# Patient Record
Sex: FEMALE | Race: BLACK OR AFRICAN AMERICAN | Employment: OTHER | URBAN - METROPOLITAN AREA
[De-identification: names, ages, dates, MRNs, and addresses within clinical notes are randomized per-mention and may not be internally consistent; named-entity substitution may affect disease eponyms.]

---

## 2018-06-13 ENCOUNTER — OFFICE VISIT (OUTPATIENT)
Dept: HEMATOLOGY | Age: 68
End: 2018-06-13

## 2018-06-13 VITALS
BODY MASS INDEX: 29.07 KG/M2 | RESPIRATION RATE: 12 BRPM | HEART RATE: 63 BPM | TEMPERATURE: 98.8 F | DIASTOLIC BLOOD PRESSURE: 76 MMHG | SYSTOLIC BLOOD PRESSURE: 144 MMHG | WEIGHT: 154 LBS | OXYGEN SATURATION: 98 % | HEIGHT: 61 IN

## 2018-06-13 DIAGNOSIS — K75.81 NASH (NONALCOHOLIC STEATOHEPATITIS): Primary | ICD-10-CM

## 2018-06-13 PROBLEM — E78.00 HYPERCHOLESTEREMIA: Status: ACTIVE | Noted: 2018-06-13

## 2018-06-13 PROBLEM — I10 HYPERTENSION: Status: ACTIVE | Noted: 2018-06-13

## 2018-06-13 PROBLEM — E11.9 TYPE II DIABETES MELLITUS (HCC): Status: ACTIVE | Noted: 2018-06-13

## 2018-06-13 RX ORDER — SIMVASTATIN 20 MG/1
1 TABLET, FILM COATED ORAL
COMMUNITY
Start: 2017-07-10

## 2018-06-13 RX ORDER — METFORMIN HYDROCHLORIDE 500 MG/1
500 TABLET, EXTENDED RELEASE ORAL
COMMUNITY
Start: 2017-07-13 | End: 2018-07-13

## 2018-06-13 RX ORDER — GLUCOSAMINE SULFATE 1500 MG
2000 POWDER IN PACKET (EA) ORAL
COMMUNITY
Start: 2018-06-04

## 2018-06-13 RX ORDER — MINERAL OIL
1 ENEMA (ML) RECTAL
COMMUNITY
Start: 2017-12-04

## 2018-06-13 RX ORDER — GLIPIZIDE 5 MG/1
5 TABLET, FILM COATED, EXTENDED RELEASE ORAL 3 TIMES DAILY
COMMUNITY
Start: 2017-07-10

## 2018-06-13 RX ORDER — TRIAMTERENE AND HYDROCHLOROTHIAZIDE 37.5; 25 MG/1; MG/1
CAPSULE ORAL
Refills: 0 | COMMUNITY
Start: 2018-06-09

## 2018-06-13 RX ORDER — AMLODIPINE BESYLATE 10 MG/1
10 TABLET ORAL
COMMUNITY
Start: 2017-07-10

## 2018-06-13 RX ORDER — POTASSIUM CHLORIDE 20 MEQ/1
20 TABLET, EXTENDED RELEASE ORAL
COMMUNITY
Start: 2017-12-04 | End: 2018-12-04

## 2018-06-13 NOTE — MR AVS SNAPSHOT
[address] [address] 
[phone] Patient: Rukhsana Medrano MRN: [id number] IPC:[id number] Visit Information Date & Time Provider Department Dept. Phone Encounter #  
 6/13/2018  3:00 PM MD Rufina Newman 13 of  [address] [id number] Upcoming Health Maintenance Date Due Hepatitis C Screening 1950 DTaP/Tdap/Td series (1 - Tdap) 3/22/1972 BREAST CANCER SCRN MAMMOGRAM 3/22/2001 FOBT Q 1 YEAR AGE 50-75 3/22/2001 ZOSTER VACCINE AGE 60> 1/22/2011 GLAUCOMA SCREENING Q2Y 3/22/2016 Bone Densitometry (Dexa) Screening 3/22/2016 Pneumococcal 65+ Low/Medium Risk (1 of 2 - PCV13) 3/22/2016 Influenza Age 5 to Adult 8/1/2018 Allergies as of 6/13/2018  Review Complete On: 6/13/2018 By: Deonte Nunn Severity Noted Reaction Type Reactions Sulfasalazine High 11/23/2014    Rash Lotrel [Amlodipine-benazepril]  06/13/2018    Cough Current Immunizations  Never Reviewed No immunizations on file. Not reviewed this visit Vitals BP Pulse Temp Resp Height(growth percentile) 144/76 (BP 1 Location: Right arm, BP Patient Position: Sitting) 63 98.8 °F (37.1 °C) (Tympanic) 12 5' 1\" (1.549 m) Weight(growth percentile) SpO2 BMI OB Status Smoking Status 154 lb (69.9 kg) 98% 29.1 kg/m2 Hysterectomy Never Smoker Vitals History BMI and BSA Data Body Mass Index Body Surface Area  
 29.1 kg/m 2 1.73 m 2 Preferred Pharmacy Pharmacy Name Phone [facility] [phone] Your Updated Medication List  
  
   
This list is accurate as of 6/13/18  3:26 PM.  Always use your most recent med list. amLODIPine 10 mg tablet Commonly known as:  Sander Cazares Take 10 mg by mouth. aspirin-calcium carbonate 81 mg-300 mg calcium(777 mg) Tab Take 1 Tab by mouth. blood glucose test strip Generic drug:  glucose blood VI test strips Use to check blood sugar tid  
  
 cholecalciferol 1,000 unit Cap Commonly known as:  VITAMIN D3 Take 2,000 Units by mouth. fexofenadine 180 mg tablet Commonly known as:  Analilia Luis Felipe Take 1 Tab by mouth. glipiZIDE SR 5 mg CR tablet Commonly known as:  GLUCOTROL XL Take 1 Tab by mouth. JANUVIA 100 mg tablet Generic drug:  SITagliptin Take 1 Tab by mouth.  
  
 metFORMIN  mg tablet Commonly known as:  GLUCOPHAGE XR Take 500 mg by mouth.  
  
 potassium chloride 20 mEq tablet Commonly known as:  K-DUR, KLOR-CON Take 20 mEq by mouth.  
  
 triamterene-hydroCHLOROthiazide 37.5-25 mg per capsule Commonly known as:  DYAZIDE  
  
 VITAMIN E PO Take  by mouth. ZOCOR 20 mg tablet Generic drug:  simvastatin Take 1 Tab by mouth. Introducing [facility]! [employer] introduces [facility] patient portal. Now you can access parts of your medical record, email your doctor's office, and request medication refills online. 1. In your internet browser, go to [email or web address] 2. Click on the First Time User? Click Here link in the Sign In box. You will see the New Member Sign Up page. 3. Enter your [facility] Access Code exactly as it appears below. You will not need to use this code after youve completed the sign-up process. If you do not sign up before the expiration date, you must request a new code. · [facility] Access Code: [email or web address] Expires: 9/11/2018  3:26 PM 
 
4. Enter the last four digits of your Social Security Number (xxxx) and Date of Birth (mm/dd/yyyy) as indicated and click Submit. You will be taken to the next sign-up page. 5. Create a [facility] ID. This will be your [facility] login ID and cannot be changed, so think of one that is secure and easy to remember. 6. Create a [employer] password. You can change your password at any time. 7. Enter your Password Reset Question and Answer. This can be used at a later time if you forget your password. 8. Enter your e-mail address. You will receive e-mail notification when new information is available in [address] 9. Click Sign Up. You can now view and download portions of your medical record. 10. Click the Download Summary menu link to download a portable copy of your medical information. If you have questions, please visit the Frequently Asked Questions section of the [employer] website. Remember, [employer] is NOT to be used for urgent needs. For medical emergencies, dial 911. Now available from your iPhone and Android! Please provide this summary of care documentation to your next provider. If you have any questions after today's visit, please call [phone].

## 2018-06-13 NOTE — PROGRESS NOTES
1. Have you been to the ER, urgent care clinic since your last visit? Hospitalized since your last visit? No    2. Have you seen or consulted any other health care providers outside of the [address] [place] [address] since your last visit? Include any pap smears or colon screening.  No   Chief Complaint   Patient presents with    New Patient     fatty liver

## 2018-06-14 NOTE — PROGRESS NOTES
70 Caro Gonzalez MD, [address], [place], [place]       LISA Cheek PA-C Marylin Arm, [facility]   LISA Cabrera NP [facility] 136    at [place] [id number], [address] [address].    [phone]    FAX: [address] [place] [address], [address]    [phone]    FAX: [phone]         Patient Care Team:  Kesha Porras DO as PCP - General (Internal Medicine)   GI. Román OconnellHeron, [place]      Problem List  Date Reviewed: 6/13/2018          Codes Class Noted    KEARNEY (nonalcoholic steatohepatitis) ICD-10-CM: K75.81  ICD-9-CM: 571.8  6/13/2018        Type II diabetes mellitus ([facility] ICD-10-CM: E11.9  ICD-9-CM: 250.00  6/13/2018        Hypertension ICD-10-CM: I10  ICD-9-CM: 401.9  6/13/2018        Hypercholesteremia ICD-10-CM: E78.00  ICD-9-CM: 272.0  6/13/2018                The clinicians listed above have asked me to see Niranjan Saez in consultation regarding KEARNEY. All medical records sent by the referring physicians were reviewed including imaging studies and pathology. The patient is a 80 y.o.  female who was found to have fatty liver disease on liver biopsy     Serologic evaluation for markers of chronic liver disease were positive for  DERIC,    Ultrasound of the liver was performed in 9/2017. The results of the imaging suggested fatty liver disease. A liver biopsy was performed in 1/2018. 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 The patient has no symptoms which can be attributed to the liver disorder.     The patient has not experienced fatigue, pain in the right side over the liver,     The patient completes all daily activities without any functional limitations. All of the issues listed in the Assessment and Plan were discussed with the patient. All questions were answered. The patient expressed a clear understanding of the above. [facility] 87 in 4 months to assessd the impact of diet and weight loss      ASSESSMENT AND PLAN:  KEARNEY   This is based upon lvier biopsy showing mild KEARNEY with portal and pericellular fibrosis. Liver transaminases are elevated. Alkaline phosphate is normal.  Liver function is normal.  The platelet count is normal.    The patient had a liver biopsy performed in [place]. Will request that the liver biopsy slides be sent to me for my personal review. The patient was counseled regarding diet and exercise to achieve weight loss. The best diet for patients with fatty liver is one very low in carbohydrates and enriched with protein such as an Tyler's program.    The patient was told to consume any food products and drinks containing fructose as this enhances hepatic fat synthesis. There is no medication of vitamin supplements that we advocate for KEARNEY. Using glitazones in patients without diabetes mellitus has been shown to reduce fat content in the liver but has no effect on fibrosis and is associated with weight gain. Vitamin E has also been used but the data is not very good and most experts no longer advocate this. The only medical treatments for KEARNEY are though clinical trials. The patient was offered participation in a clinical trial for treatment of KEARNEY. The patient would like to participate in a clinical trial.  We will get the biopsy slides and I will review these to see if she has severe enough KEARNEY to enter a clinical trial.  If not she will be followed in the practice every 4 months.     Treatment of other medical problems in patients with chronic liver disease  The patient was directed to continue all current medications at the current dosages. There are no contraindications for the patient to take any medications that are necessary for treatment of other medical issues. The patient can take oral diabetic agents for treatment of diabetes and statins for treatment of hypercholesterolemia. This will not impact the patient's current liver disease. The patient does not consume alcohol daily. Normal doses of acetaminophen can be used for pain as needed. Normal doses of acetaminophen as recommended on the label are not hepatotoxic, even in patients with cirrhosis. The patient does not have cirrhosis. Normal doses of NSAIDs can be used for pain as needed. Counseling for alcohol in patients with chronic liver disease  The patient was counseled regarding alcohol consumption and the effect of alcohol on chronic liver disease. The patient was reminded that alcohol can cause fatty liver. The patient does not consume any significant amount of alcohol. Vaccinations   Vaccination for viral hepatitis B is recommended since the patient has no serologic evidence of previous exposure or vaccination with immunity. The need for vaccination against viral hepatitis A will be assessed with serologic and instituted as appropriate. Routine vaccinations against other bacterial and viral agents can be performed as indicated. Annual flu vaccination should be administered if indicated. Screening for Hepatocellular Carcinoma  HCC screening is not necessary since the patient has no evidence of cirrhosis. ALLERGIES  Allergies   Allergen Reactions    Sulfasalazine Rash    Lotrel [Amlodipine-Benazepril] Cough       MEDICATIONS  Current Outpatient Prescriptions   Medication Sig    amLODIPine (NORVASC) 10 mg tablet Take 10 mg by mouth.  aspirin-calcium carbonate 81 mg-300 mg calcium(777 mg) tab Take 1 Tab by mouth.  fexofenadine (ALLEGRA) 180 mg tablet Take 1 Tab by mouth.     glipiZIDE SR (GLUCOTROL XL) 5 mg CR tablet Take 1 Tab by mouth.  glucose blood VI test strips (BLOOD GLUCOSE TEST) strip Use to check blood sugar tid    metFORMIN ER (GLUCOPHAGE XR) 500 mg tablet Take 500 mg by mouth.  potassium chloride (K-DUR, KLOR-CON) 20 mEq tablet Take 20 mEq by mouth.  simvastatin (ZOCOR) 20 mg tablet Take 1 Tab by mouth.  SITagliptin (JANUVIA) 100 mg tablet Take 1 Tab by mouth.  triamterene-hydroCHLOROthiazide (DYAZIDE) 37.5-25 mg per capsule     cholecalciferol (VITAMIN D3) 1,000 unit cap Take 2,000 Units by mouth.  vitamin E acetate (VITAMIN E PO) Take  by mouth. No current facility-administered medications for this visit. SYSTEM REVIEW NOT RELATED TO LIVER DISEASE OR REVIEWED ABOVE:  Constitution systems: Negative for fever, chills, weight gain, weight loss. Eyes: Negative for visual changes. ENT: Negative for sore throat, painful swallowing. Respiratory: Negative for cough, hemoptysis, SOB. Cardiology: Negative for chest pain, palpitations. GI:  Negative for constipation or diarrhea. : Negative for urinary frequency, dysuria, hematuria, nocturia. Skin: Negative for rash. Hematology: Negative for easy bruising, blood clots. Musculo-skelatal: Negative for back pain, muscle pain, weakness. Neurologic: Negative for headaches, dizziness, vertigo, memory problems not related to HE. Psychology: Negative for anxiety, depression. FAMILY HISTORY:  The father  of CVA. The mother  of pancreas cancer. There is no family history of liver disease. SOCIAL HISTORY:  The patient is . The patient has 2 children, and 2 grandchildren. The patient has never used tobacco products. The patient has never consumed significant amounts of alcohol.     The patient currently works full time as a .        PHYSICAL EXAMINATION:  Visit Vitals    /76 (BP 1 Location: Right arm, BP Patient Position: Sitting)    Pulse 63    Temp 98.8 °F (37.1 °C) (Tympanic)    Resp 12    Ht 5' 1\" (1.549 m)    Wt 154 lb (69.9 kg)    SpO2 98%    BMI 29.1 kg/m2     General: No acute distress. Eyes: Sclera anicteric. ENT: No oral lesions. Thyroid normal.  Nodes: No adenopathy. Skin: No spider angiomata. No jaundice. No palmar erythema. Respiratory: Lungs clear to auscultation. Cardiovascular: Regular heart rate. No murmurs. No JVD. Abdomen: Soft non-tender. Liver size normal to percussion/palpation. Spleen not palpable. No obvious ascites. Extremities: No edema. No muscle wasting. No gross arthritic changes. Neurologic: Alert and oriented. Cranial nerves grossly intact. No asterixis. LABORATORY STUDIES:  From 5/2018  AST/ALT/ALP/T Bili/ALB:  29/50/65/0.5/4.0  WBC/HB/PLT/INR:  5.1/13.7/197  BUN/CREAT:  12/0.84    SEROLOGIES:  11/2017. HBsAntigen negative, anti-HBcore negative, anti-HBsurface negative, anti-HCV negative, iron saturation 20%, DERIC positive, ASMA negative, AMA negative, alpha-1-antitrypsin 140. LIVER HISTOLOGY:  1/2018.  30% steatosis, mild inflammation. Portal and pericellular fibrosis, stage 2. Biopsy slides not reviewed by MLS. ENDOSCOPIC PROCEDURES:  Not available or performed    RADIOLOGY:  5/2018. Ultrasound of liver. Echogenic consistent with fatty liver. No liver mass lesions. No dilated bile ducts. No ascites.     OTHER TESTING:  Not available or performed      Annia Diaz MD  [facility] 13  [address], [address]  [phone]

## 2018-10-11 ENCOUNTER — HOSPITAL ENCOUNTER (OUTPATIENT)
Dept: LAB | Age: 68
Discharge: HOME | End: 2018-10-11
Payer: MEDICARE

## 2018-10-11 ENCOUNTER — OFFICE VISIT (OUTPATIENT)
Dept: HEMATOLOGY | Age: 68
End: 2018-10-11

## 2018-10-11 VITALS
BODY MASS INDEX: 27.9 KG/M2 | TEMPERATURE: 97.4 F | RESPIRATION RATE: 18 BRPM | HEART RATE: 72 BPM | SYSTOLIC BLOOD PRESSURE: 144 MMHG | WEIGHT: 147.8 LBS | HEIGHT: 61 IN | OXYGEN SATURATION: 98 % | DIASTOLIC BLOOD PRESSURE: 77 MMHG

## 2018-10-11 DIAGNOSIS — K75.81 NASH (NONALCOHOLIC STEATOHEPATITIS): ICD-10-CM

## 2018-10-11 DIAGNOSIS — K75.81 NASH (NONALCOHOLIC STEATOHEPATITIS): Primary | ICD-10-CM

## 2018-10-11 LAB
ALBUMIN SERPL-MCNC: 4.1 G/DL (ref 3.4–5)
ALBUMIN/GLOB SERPL: 1.1 {RATIO} (ref 0.8–1.7)
ALP SERPL-CCNC: 70 U/L (ref 45–117)
ALT SERPL-CCNC: 47 U/L (ref 13–56)
ANION GAP SERPL CALC-SCNC: 8 MMOL/L (ref 3–18)
AST SERPL-CCNC: 19 U/L (ref 15–37)
BASOPHILS # BLD: 0 K/UL (ref 0–0.1)
BASOPHILS NFR BLD: 0 % (ref 0–2)
BILIRUB DIRECT SERPL-MCNC: 0.1 MG/DL (ref 0–0.2)
BILIRUB SERPL-MCNC: 0.2 MG/DL (ref 0.2–1)
BUN SERPL-MCNC: 11 MG/DL (ref 7–18)
BUN/CREAT SERPL: 16 (ref 12–20)
CALCIUM SERPL-MCNC: 9.7 MG/DL (ref 8.5–10.1)
CHLORIDE SERPL-SCNC: 104 MMOL/L (ref 100–108)
CO2 SERPL-SCNC: 31 MMOL/L (ref 21–32)
CREAT SERPL-MCNC: 0.68 MG/DL (ref 0.6–1.3)
DIFFERENTIAL METHOD BLD: ABNORMAL
EOSINOPHIL # BLD: 0.1 K/UL (ref 0–0.4)
EOSINOPHIL NFR BLD: 1 % (ref 0–5)
ERYTHROCYTE [DISTWIDTH] IN BLOOD BY AUTOMATED COUNT: 16.6 % (ref 11.6–14.5)
GLOBULIN SER CALC-MCNC: 3.7 G/DL (ref 2–4)
GLUCOSE SERPL-MCNC: 83 MG/DL (ref 74–99)
HCT VFR BLD AUTO: 41.3 % (ref 35–45)
HGB BLD-MCNC: 13.1 G/DL (ref 12–16)
LYMPHOCYTES # BLD: 1.9 K/UL (ref 0.9–3.6)
LYMPHOCYTES NFR BLD: 33 % (ref 21–52)
MCH RBC QN AUTO: 21.1 PG (ref 24–34)
MCHC RBC AUTO-ENTMCNC: 31.7 G/DL (ref 31–37)
MCV RBC AUTO: 66.5 FL (ref 74–97)
MONOCYTES # BLD: 0.4 K/UL (ref 0.05–1.2)
MONOCYTES NFR BLD: 7 % (ref 3–10)
NEUTS SEG # BLD: 3.3 K/UL (ref 1.8–8)
NEUTS SEG NFR BLD: 59 % (ref 40–73)
PLATELET # BLD AUTO: 211 K/UL (ref 135–420)
POTASSIUM SERPL-SCNC: 4.4 MMOL/L (ref 3.5–5.5)
PROT SERPL-MCNC: 7.8 G/DL (ref 6.4–8.2)
RBC # BLD AUTO: 6.21 M/UL (ref 4.2–5.3)
SODIUM SERPL-SCNC: 143 MMOL/L (ref 136–145)
WBC # BLD AUTO: 5.7 K/UL (ref 4.6–13.2)

## 2018-10-11 PROCEDURE — 80076 HEPATIC FUNCTION PANEL: CPT | Performed by: NURSE PRACTITIONER

## 2018-10-11 PROCEDURE — 36415 COLL VENOUS BLD VENIPUNCTURE: CPT | Performed by: NURSE PRACTITIONER

## 2018-10-11 PROCEDURE — 80048 BASIC METABOLIC PNL TOTAL CA: CPT | Performed by: NURSE PRACTITIONER

## 2018-10-11 PROCEDURE — 85025 COMPLETE CBC W/AUTO DIFF WBC: CPT | Performed by: NURSE PRACTITIONER

## 2018-10-11 RX ORDER — GUAIFENESIN 100 MG/5ML
81 LIQUID (ML) ORAL DAILY
COMMUNITY

## 2018-10-11 RX ORDER — METFORMIN HYDROCHLORIDE 500 MG/1
500 TABLET, EXTENDED RELEASE ORAL 2 TIMES DAILY
COMMUNITY
Start: 2018-07-09 | End: 2019-07-09

## 2018-10-11 NOTE — PROGRESS NOTES
70 Caro Gonzalez MD, [address], [place], [place]       Zee Rodriguez, STEPHANIE Vann, [facility]-BC   Anjana Salamanca, LISA Dueñas [facility] 136    at [address], [address] [address].    [phone]    FAX: [address] [place] [address]    at [address], [address], [address]    [phone]    FAX: [phone]         Patient Care Team:  Hulon Cooks, DO as PCP - General (Internal Medicine)   GI. Román Oconnell, [address] [place] [address]      Problem List  Date Reviewed: 10/11/2018          Codes Class Noted    KEARNEY (nonalcoholic steatohepatitis) ICD-10-CM: K75.81  ICD-9-CM: 571.8  6/13/2018        Type II diabetes mellitus ([facility] ICD-10-CM: E11.9  ICD-9-CM: 250.00  6/13/2018        Hypertension ICD-10-CM: I10  ICD-9-CM: 401.9  6/13/2018        Hypercholesteremia ICD-10-CM: E78.00  ICD-9-CM: 272.0  6/13/2018              Darlene Chin returns to the [employer] today for education and management of KEARNEY. The active problem list, all pertinent past medical history, medications, liver histology, endoscopic studies, radiologic findings and laboratory findings related to the liver disorder were reviewed with the patient. The patient is a 80 y.o.  female who was found to have fatty liver disease on liver biopsy     Serologic evaluation for markers of chronic liver disease were positive for  DERIC. Ultrasound of the liver was performed in 9/2017. The results of the imaging suggested fatty liver disease. A liver biopsy was performed in 1/2018. 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        The patient has no symptoms which can be attributed to the liver disorder. The patient completes all daily activities without any functional limitations. The patient has not experienced fatigue, fevers, chills, shortness of breath, chest pain, pain in the right side over the liver, diffuse abdominal pain, nausea, vomiting, constipation, diarrhrea, dry eyes, dry mouth, arthralgias, myalgias, yellowing of the eyes or skin, itching, dark urine, problems concentrating, swelling of the abdomen, swelling of the lower extremities, hematemesis, or hematochezia. All of the issues listed in the Assessment and Plan were discussed with the patient. All questions were answered. The patient expressed a clear understanding of the above. ASSESSMENT AND PLAN:  KEARNEY   This is based upon lvier biopsy showing mild KEARNEY with portal and pericellular fibrosis. The most recent laboratory studies indicate that the liver transaminases are normal, alkaline phosphatase is normal, tests of hepatic synthetic and metabolic function are normal, and the platelet count is normal.       The patient had a liver biopsy performed in [facility]. Will request that the liver biopsy slides be sent for review by Dr. Han Goddard. The patient was counseled regarding diet and exercise to achieve weight loss. The best diet for patients with fatty liver is one very low in carbohydrates and enriched with protein such as an Tyler's program.      The patient was told to consume any food products and drinks containing fructose as this enhances hepatic fat synthesis. There is no medication of vitamin supplements that we advocate for KEARNEY. Using glitazones in patients without diabetes mellitus has been shown to reduce fat content in the liver but has no effect on fibrosis and is associated with weight gain. Vitamin E has also been used but the data is not very good and most experts no longer advocate this. The only medical treatments for KEARNEY are though clinical trials.   The patient was offered participation in a clinical trial for treatment of KEARNEY. The patient would like to participate in a clinical trial.  We will get the biopsy slides and I will review these to see if she has severe enough KEARNEY to enter a clinical trial.  If not she will be followed in the practice every 6 months. Treatment of other medical problems in patients with chronic liver disease  The patient was directed to continue all current medications at the current dosages. There are no contraindications for the patient to take any medications that are necessary for treatment of other medical issues. The patient can take oral diabetic agents for treatment of diabetes and statins for treatment of hypercholesterolemia. This will not impact the patient's current liver disease. The patient does not consume alcohol daily. Normal doses of acetaminophen can be used for pain as needed. Normal doses of acetaminophen as recommended on the label are not hepatotoxic, even in patients with cirrhosis. The patient does not have cirrhosis. Normal doses of NSAIDs can be used for pain as needed. Counseling for alcohol in patients with chronic liver disease  The patient was counseled regarding alcohol consumption and the effect of alcohol on chronic liver disease. The patient was reminded that alcohol can cause fatty liver. The patient does not consume any significant amount of alcohol. Vaccinations   Vaccination for viral hepatitis B is recommended since the patient has no serologic evidence of previous exposure or vaccination with immunity. The need for vaccination against viral hepatitis A will be assessed with serologic and instituted as appropriate. Routine vaccinations against other bacterial and viral agents can be performed as indicated. Annual flu vaccination should be administered if indicated.     Screening for Hepatocellular Carcinoma  HCC screening is not necessary since the patient has no evidence of cirrhosis. ALLERGIES  Allergies   Allergen Reactions    Sulfasalazine Rash    Lotrel [Amlodipine-Benazepril] Cough       MEDICATIONS  Current Outpatient Prescriptions   Medication Sig    aspirin 81 mg chewable tablet Take 81 mg by mouth daily.  metFORMIN ER (GLUCOPHAGE XR) 500 mg tablet Take 500 mg by mouth two (2) times a day.  amLODIPine (NORVASC) 10 mg tablet Take 10 mg by mouth.  fexofenadine (ALLEGRA) 180 mg tablet Take 1 Tab by mouth.  glipiZIDE SR (GLUCOTROL XL) 5 mg CR tablet Take 1 Tab by mouth.  glucose blood VI test strips (BLOOD GLUCOSE TEST) strip Use to check blood sugar tid    potassium chloride (K-DUR, KLOR-CON) 20 mEq tablet Take 20 mEq by mouth.  simvastatin (ZOCOR) 20 mg tablet Take 1 Tab by mouth.  SITagliptin (JANUVIA) 100 mg tablet Take 1 Tab by mouth.  triamterene-hydroCHLOROthiazide (DYAZIDE) 37.5-25 mg per capsule     cholecalciferol (VITAMIN D3) 1,000 unit cap Take 2,000 Units by mouth.  vitamin E acetate (VITAMIN E PO) Take  by mouth. No current facility-administered medications for this visit. SYSTEM REVIEW NOT RELATED TO LIVER DISEASE OR REVIEWED ABOVE:  Constitution systems: Negative for fever, chills, weight gain, weight loss. Eyes: Negative for visual changes. ENT: Negative for sore throat, painful swallowing. Respiratory: Negative for cough, hemoptysis, SOB. Cardiology: Negative for chest pain, palpitations. GI:  Negative for constipation or diarrhea. : Negative for urinary frequency, dysuria, hematuria, nocturia. Skin: Negative for rash. Hematology: Negative for easy bruising, blood clots. Musculo-skelatal: Negative for back pain, muscle pain, weakness. Neurologic: Negative for headaches, dizziness, vertigo, memory problems not related to HE. Psychology: Negative for anxiety, depression. FAMILY HISTORY:  The father  of CVA. The mother  of pancreas cancer.     There is no family history of liver disease. SOCIAL HISTORY:  The patient is . The patient has 2 children, and 2 grandchildren. The patient has never used tobacco products. The patient has never consumed significant amounts of alcohol. The patient is retired. PHYSICAL EXAMINATION:  Visit Vitals    /77 (BP 1 Location: Right arm, BP Patient Position: Sitting)    Pulse 72    Temp 97.4 °F (36.3 °C) (Tympanic)    Resp 18    Ht 5' 1\" (1.549 m)    Wt 147 lb 12.8 oz (67 kg)    SpO2 98%    BMI 27.93 kg/m2     General: No acute distress. Eyes: Sclera anicteric. ENT: No oral lesions. Thyroid normal.  Nodes: No adenopathy. Skin: No spider angiomata. No jaundice. No palmar erythema. Respiratory: Lungs clear to auscultation. Cardiovascular: Regular heart rate. No murmurs. No JVD. Abdomen: Soft non-tender. Liver size normal to percussion/palpation. Spleen not palpable. No obvious ascites. Extremities: No edema. No muscle wasting. No gross arthritic changes. Neurologic: Alert and oriented. Cranial nerves grossly intact. No asterixis. LABORATORY STUDIES:  Liver [place] of [address] [place] [address] 10/11/2018   WBC 4.6 - 13.2 K/uL 5.7   ANC 1.8 - 8.0 K/UL 3.3   HGB 12.0 - 16.0 g/dL 13.1    - 420 K/uL 211   AST 15 - 37 U/L 19   ALT 13 - 56 U/L 47   Alk Phos 45 - 117 U/L 70   Bili, Total 0.2 - 1.0 MG/DL 0.2   Bili, Direct 0.0 - 0.2 MG/DL 0.1   Albumin 3.4 - 5.0 g/dL 4.1   BUN 7.0 - 18 MG/DL 11   Creat 0.6 - 1.3 MG/DL 0.68   Na 136 - 145 mmol/L 143   K 3.5 - 5.5 mmol/L 4.4   Cl 100 - 108 mmol/L 104   CO2 21 - 32 mmol/L 31   Glucose 74 - 99 mg/dL 83     From 5/2018  AST/ALT/ALP/T Bili/ALB:  29/50/65/0.5/4.0  WBC/HB/PLT/INR:  5.1/13.7/197  BUN/CREAT:  12/0.84    SEROLOGIES:  11/2017. HBsAntigen negative, anti-HBcore negative, anti-HBsurface negative, anti-HCV negative, iron saturation 20%, DERIC positive, ASMA negative, AMA negative, alpha-1-antitrypsin 140.     LIVER HISTOLOGY:  1/2018.  30% steatosis, mild inflammation. Portal and pericellular fibrosis, stage 2. Biopsy slides not reviewed by MLS. ENDOSCOPIC PROCEDURES:  Not available or performed    RADIOLOGY:  5/2018. Ultrasound of liver. Echogenic consistent with fatty liver. No liver mass lesions. No dilated bile ducts. No ascites.     OTHER TESTING:  Not available or performed    Follow-up René Peters 32 in 6 months to assessd the impact of diet and weight loss    HENRY VallejoC  [place] [address] [place] [address]   [phone]

## 2018-10-11 NOTE — MR AVS SNAPSHOT
[address] [place] [address] [facility] [address] [place] [address] 
[phone] Patient: Thanh Ballard MRN: [id number] OEA:[birth year] Visit Information Date & Time Provider Department Dept. Phone Encounter #  
 10/11/2018  8:30 AM LISA Eric [address] of  [address] [id number] Follow-up Instructions Return in about 6 months (around 4/11/2019). Upcoming Health Maintenance Date Due Hepatitis C Screening 1950 HEMOGLOBIN A1C Q6M 1950 LIPID PANEL Q1 1950 FOOT EXAM Q1 3/22/1961 MICROALBUMIN Q1 3/22/1961 EYE EXAM RETINAL OR DILATED Q1 3/22/1961 DTaP/Tdap/Td series (1 - Tdap) 3/22/1972 Shingrix Vaccine Age 50> (1 of 2) 3/22/2001 BREAST CANCER SCRN MAMMOGRAM 3/22/2001 FOBT Q 1 YEAR AGE 50-75 3/22/2001 GLAUCOMA SCREENING Q2Y 3/22/2016 Bone Densitometry (Dexa) Screening 3/22/2016 Pneumococcal 65+ Low/Medium Risk (2 of 2 - PPSV23) 4/4/2016 MEDICARE YEARLY EXAM 6/13/2018 Influenza Age 5 to Adult 8/1/2018 Allergies as of 10/11/2018  Review Complete On: 10/11/2018 By: Christina Godinez Severity Noted Reaction Type Reactions Sulfasalazine High 11/23/2014    Rash Lotrel [Amlodipine-benazepril]  06/13/2018    Cough Current Immunizations  Never Reviewed No immunizations on file. Not reviewed this visit You Were Diagnosed With   
  
 Codes Comments KEARNEY (nonalcoholic steatohepatitis)    -  Primary ICD-10-CM: D10.47 ICD-9-CM: 571.8 Vitals BP Pulse Temp Resp Height(growth percentile) 144/77 (BP 1 Location: Right arm, BP Patient Position: Sitting) 72 97.4 °F (36.3 °C) (Tympanic) 18 5' 1\" (1.549 m) Weight(growth percentile) SpO2 BMI OB Status Smoking Status 147 lb 12.8 oz (67 kg) 98% 27.93 kg/m2 Hysterectomy Never Smoker Vitals History BMI and BSA Data Body Mass Index Body Surface Area 27.93 kg/m 2 1.7 m 2 Preferred Pharmacy Pharmacy Name Phone [facility] [phone] Your Updated Medication List  
  
   
This list is accurate as of 10/11/18  8:58 AM.  Always use your most recent med list. amLODIPine 10 mg tablet Commonly known as:  Smith Mendiola Take 10 mg by mouth. aspirin 81 mg chewable tablet Take 81 mg by mouth daily. blood glucose test strip Generic drug:  glucose blood VI test strips Use to check blood sugar tid  
  
 cholecalciferol 1,000 unit Cap Commonly known as:  VITAMIN D3 Take 2,000 Units by mouth. fexofenadine 180 mg tablet Commonly known as:  Sharilyn Keaton Take 1 Tab by mouth. glipiZIDE SR 5 mg CR tablet Commonly known as:  GLUCOTROL XL Take 1 Tab by mouth. JANUVIA 100 mg tablet Generic drug:  SITagliptin Take 1 Tab by mouth.  
  
 metFORMIN  mg tablet Commonly known as:  GLUCOPHAGE XR Take 500 mg by mouth two (2) times a day. potassium chloride 20 mEq tablet Commonly known as:  K-DUR, KLOR-CON Take 20 mEq by mouth.  
  
 triamterene-hydroCHLOROthiazide 37.5-25 mg per capsule Commonly known as:  DYAZIDE  
  
 VITAMIN E PO Take  by mouth. ZOCOR 20 mg tablet Generic drug:  simvastatin Take 1 Tab by mouth. Follow-up Instructions Return in about 6 months (around 4/11/2019). To-Do List   
 10/11/2018 Lab:  CBC WITH AUTOMATED DIFF   
  
 10/11/2018 Lab:  HEPATIC FUNCTION PANEL   
  
 10/11/2018 Lab:  METABOLIC PANEL, BASIC   
  
 10/11/2018 Imaging:  US ABD LTD W ELASTOGRAPHY Introducing [facility]! [facility] introduces [employer] patient portal. Now you can access parts of your medical record, email your doctor's office, and request medication refills online. 1. In your internet browser, go to [email or web address]"/[email or web address] 2. Click on the First Time User? Click Here link in the Sign In box. You will see the New Member Sign Up page. 3. Enter your [employer] Access Code exactly as it appears below. You will not need to use this code after youve completed the sign-up process. If you do not sign up before the expiration date, you must request a new code. · [employer] Access Code: [email or web address] Expires: 1/9/2019  8:58 AM 
 
4. Enter the last four digits of your Social Security Number (xxxx) and Date of Birth (mm/dd/yyyy) as indicated and click Submit. You will be taken to the next sign-up page. 5. Create a Larky ID. This will be your [employer]ark[employer] login ID and cannot be changed, so think of one that is secure and easy to remember. 6. Create a [employer] password. You can change your password at any time. 7. Enter your Password Reset Question and Answer. This can be used at a later time if you forget your password. 8. Enter your e-mail address. You will receive e-mail notification when new information is available in [address] 9. Click Sign Up. You can now view and download portions of your medical record. 10. Click the Download Summary menu link to download a portable copy of your medical information. If you have questions, please visit the Frequently Asked Questions section of the [employer] website. Remember, [employer] is NOT to be used for urgent needs. For medical emergencies, dial 911. Now available from your iPhone and Android! Please provide this summary of care documentation to your next provider. Your primary care clinician is listed as Kathleen Johnson. If you have any questions after today's visit, please call [phone].

## 2018-11-03 ENCOUNTER — HOSPITAL ENCOUNTER (OUTPATIENT)
Dept: ULTRASOUND IMAGING | Age: 68
Discharge: HOME | End: 2018-11-03
Payer: MEDICARE

## 2018-11-03 DIAGNOSIS — K75.81 NASH (NONALCOHOLIC STEATOHEPATITIS): ICD-10-CM

## 2018-11-03 PROCEDURE — 0346T US ABD LTD W ELASTOGRAPHY: CPT

## 2018-11-05 RX ORDER — ONDANSETRON 2 MG/ML
INJECTION INTRAMUSCULAR; INTRAVENOUS
Status: DISPENSED
Start: 2018-11-05 | End: 2018-11-05

## 2019-04-23 ENCOUNTER — OFFICE VISIT (OUTPATIENT)
Dept: HEMATOLOGY | Age: 69
End: 2019-04-23

## 2019-04-23 ENCOUNTER — HOSPITAL ENCOUNTER (OUTPATIENT)
Dept: LAB | Age: 69
Discharge: HOME | End: 2019-04-23
Payer: MEDICARE

## 2019-04-23 VITALS
SYSTOLIC BLOOD PRESSURE: 150 MMHG | OXYGEN SATURATION: 99 % | TEMPERATURE: 96.7 F | HEIGHT: 61 IN | BODY MASS INDEX: 28.25 KG/M2 | DIASTOLIC BLOOD PRESSURE: 75 MMHG | WEIGHT: 149.6 LBS | HEART RATE: 64 BPM

## 2019-04-23 DIAGNOSIS — K75.81 NASH (NONALCOHOLIC STEATOHEPATITIS): Primary | ICD-10-CM

## 2019-04-23 DIAGNOSIS — K75.81 NASH (NONALCOHOLIC STEATOHEPATITIS): ICD-10-CM

## 2019-04-23 LAB
ALBUMIN SERPL-MCNC: 3.9 G/DL (ref 3.4–5)
ALBUMIN/GLOB SERPL: 1.2 {RATIO} (ref 0.8–1.7)
ALP SERPL-CCNC: 65 U/L (ref 45–117)
ALT SERPL-CCNC: 34 U/L (ref 13–56)
ANION GAP SERPL CALC-SCNC: 6 MMOL/L (ref 3–18)
AST SERPL-CCNC: 17 U/L (ref 15–37)
BASOPHILS # BLD: 0 K/UL (ref 0–0.1)
BASOPHILS NFR BLD: 0 % (ref 0–2)
BILIRUB DIRECT SERPL-MCNC: <0.1 MG/DL (ref 0–0.2)
BILIRUB SERPL-MCNC: 0.2 MG/DL (ref 0.2–1)
BUN SERPL-MCNC: 13 MG/DL (ref 7–18)
BUN/CREAT SERPL: 18 (ref 12–20)
CALCIUM SERPL-MCNC: 8.7 MG/DL (ref 8.5–10.1)
CHLORIDE SERPL-SCNC: 106 MMOL/L (ref 100–108)
CO2 SERPL-SCNC: 31 MMOL/L (ref 21–32)
CREAT SERPL-MCNC: 0.72 MG/DL (ref 0.6–1.3)
DIFFERENTIAL METHOD BLD: ABNORMAL
EOSINOPHIL # BLD: 0.1 K/UL (ref 0–0.4)
EOSINOPHIL NFR BLD: 1 % (ref 0–5)
ERYTHROCYTE [DISTWIDTH] IN BLOOD BY AUTOMATED COUNT: 16.1 % (ref 11.6–14.5)
GLOBULIN SER CALC-MCNC: 3.2 G/DL (ref 2–4)
GLUCOSE SERPL-MCNC: 65 MG/DL (ref 74–99)
HCT VFR BLD AUTO: 38.2 % (ref 35–45)
HGB BLD-MCNC: 11.9 G/DL (ref 12–16)
LYMPHOCYTES # BLD: 1.8 K/UL (ref 0.9–3.6)
LYMPHOCYTES NFR BLD: 30 % (ref 21–52)
MCH RBC QN AUTO: 20.9 PG (ref 24–34)
MCHC RBC AUTO-ENTMCNC: 31.2 G/DL (ref 31–37)
MCV RBC AUTO: 67.1 FL (ref 74–97)
MONOCYTES # BLD: 0.4 K/UL (ref 0.05–1.2)
MONOCYTES NFR BLD: 7 % (ref 3–10)
NEUTS SEG # BLD: 3.6 K/UL (ref 1.8–8)
NEUTS SEG NFR BLD: 62 % (ref 40–73)
PLATELET # BLD AUTO: 227 K/UL (ref 135–420)
POTASSIUM SERPL-SCNC: 3.6 MMOL/L (ref 3.5–5.5)
PROT SERPL-MCNC: 7.1 G/DL (ref 6.4–8.2)
RBC # BLD AUTO: 5.69 M/UL (ref 4.2–5.3)
SODIUM SERPL-SCNC: 143 MMOL/L (ref 136–145)
WBC # BLD AUTO: 5.9 K/UL (ref 4.6–13.2)

## 2019-04-23 PROCEDURE — 80048 BASIC METABOLIC PNL TOTAL CA: CPT

## 2019-04-23 PROCEDURE — 80076 HEPATIC FUNCTION PANEL: CPT

## 2019-04-23 PROCEDURE — 85025 COMPLETE CBC W/AUTO DIFF WBC: CPT

## 2019-04-23 PROCEDURE — 36415 COLL VENOUS BLD VENIPUNCTURE: CPT

## 2019-04-23 NOTE — PROGRESS NOTES
1. Have you been to the ER, urgent care clinic since your last visit? Hospitalized since your last visit? Yes [place]     2. Have you seen or consulted any other health care providers outside of the [address] [place] [address] since your last visit? Include any pap smears or colon screening.  No

## 2019-04-23 NOTE — PROGRESS NOTES
70 Caro Gonzalez MD, Al[facility], [place], [place]       LISA Marcus Head, STEPHANIE Hammodns Officer, [facility]   LISA Dihllon NP [facility] 136    at [facility]    [address] [facility], [address], [facility] 22.    [phone]    FAX: [address]    at [facility]    [address], [address] [facility] [address], [address]    [phone]    FAX: [phone]         Patient Care Team:  Susan Shaw DO as PCP - General (Internal Medicine)   GI. Román Oconnell, [address] [place] [address]      Problem List  Date Reviewed: 4/23/2019          Codes Class Noted    KEARNEY (nonalcoholic steatohepatitis) ICD-10-CM: K75.81  ICD-9-CM: 571.8  6/13/2018        Type II diabetes mellitus ([facility] ICD-10-CM: E11.9  ICD-9-CM: 250.00  6/13/2018        Hypertension ICD-10-CM: I10  ICD-9-CM: 401.9  6/13/2018        Hypercholesteremia ICD-10-CM: E78.00  ICD-9-CM: 272.0  6/13/2018              Karthik Boo returns to the [employer] today for education and management of KEARNEY. The active problem list, all pertinent past medical history, medications, liver histology, endoscopic studies, radiologic findings and laboratory findings related to the liver disorder were reviewed with the patient. The patient is a 77 y.o.  female who was found to have fatty liver disease on liver biopsy     Serologic evaluation for markers of chronic liver disease were positive for  DERIC. Ultrasound of the liver was most recently performed in 11/2018. The results of the imaging suggested fatty liver disease. No hepatic masses. Shear wave elastography was performed in 11/2018. This suggests a Metavir fibrosis score of F0, normal hepatic fibrosis.      A liver biopsy was performed in 1/2018. 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 The patient has no symptoms which can be attributed to the liver disorder. The patient completes all daily activities without any functional limitations. The patient has not experienced fatigue, fevers, chills, shortness of breath, chest pain, pain in the right side over the liver, diffuse abdominal pain, nausea, vomiting, constipation, diarrhrea, dry eyes, dry mouth, arthralgias, myalgias, yellowing of the eyes or skin, itching, dark urine, problems concentrating, swelling of the abdomen, swelling of the lower extremities, hematemesis, or hematochezia. All of the issues listed in the Assessment and Plan were discussed with the patient. All questions were answered. The patient expressed a clear understanding of the above. 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 The most recent laboratory studies indicate that the liver transaminases are normal, alkaline phosphatase is normal, tests of hepatic synthetic and metabolic function are normal, and the platelet count is normal.       The patient had a liver biopsy performed in [facility]. Will request that the liver biopsy slides be sent for review by Dr. Boris Molina. The patient had shear wave elastography completed in 11/2018. This suggests that the liver is normal and without hepatic fibrosis. Metavir fibrosis score of F0. The elastography and ultrasound will be repeated in 11/2019. We discussed metabolic syndrome. I explained that she has several components of metabolic syndrome including central obesity, HTN, hypercholesterolemia, and diabetes. The patient was counseled regarding diet and exercise to achieve weight loss.   The best diet for patients with fatty liver is one very low in carbohydrates and enriched with protein such as an Tyler's program.      The patient was told to consume any food products and drinks containing fructose as this enhances hepatic fat synthesis. There is no medication of vitamin supplements that we advocate for KEARNEY. Using glitazones in patients without diabetes mellitus has been shown to reduce fat content in the liver but has no effect on fibrosis and is associated with weight gain. Vitamin E has also been used but the data is not very good and most experts no longer advocate this. The only medical treatments for KEARNEY are though clinical trials. The patient was offered participation in a clinical trial for treatment of KEARNEY. The patient would like to participate in a clinical trial.  We will get the biopsy slides and I will review these to see if she has severe enough KEARNEY to enter a clinical trial.  If not she will be followed in the practice every 6 months. Treatment of other medical problems in patients with chronic liver disease  The patient was directed to continue all current medications at the current dosages. There are no contraindications for the patient to take any medications that are necessary for treatment of other medical issues. The patient can take oral diabetic agents for treatment of diabetes and statins for treatment of hypercholesterolemia. This will not impact the patient's current liver disease. The patient does not consume alcohol daily. Normal doses of acetaminophen can be used for pain as needed. Normal doses of acetaminophen as recommended on the label are not hepatotoxic, even in patients with cirrhosis. The patient does not have cirrhosis. Normal doses of NSAIDs can be used for pain as needed. Counseling for alcohol in patients with chronic liver disease  The patient was counseled regarding alcohol consumption and the effect of alcohol on chronic liver disease. The patient was reminded that alcohol can cause fatty liver.   The patient does not consume any significant amount of alcohol. Vaccinations   Vaccination for viral hepatitis B is recommended since the patient has no serologic evidence of previous exposure or vaccination with immunity. The need for vaccination against viral hepatitis A will be assessed with serologic and instituted as appropriate. Routine vaccinations against other bacterial and viral agents can be performed as indicated. Annual flu vaccination should be administered if indicated. Screening for Hepatocellular Carcinoma  HCC screening is not necessary since the patient has no evidence of cirrhosis. ALLERGIES  Allergies   Allergen Reactions    Sulfasalazine Rash    Lotrel [Amlodipine-Benazepril] Cough       MEDICATIONS  Current Outpatient Medications   Medication Sig    aspirin 81 mg chewable tablet Take 81 mg by mouth daily.  metFORMIN ER (GLUCOPHAGE XR) 500 mg tablet Take 500 mg by mouth two (2) times a day.  amLODIPine (NORVASC) 10 mg tablet Take 10 mg by mouth.  glipiZIDE SR (GLUCOTROL XL) 5 mg CR tablet Take 1 Tab by mouth.  glucose blood VI test strips (BLOOD GLUCOSE TEST) strip Use to check blood sugar tid    simvastatin (ZOCOR) 20 mg tablet Take 1 Tab by mouth.  SITagliptin (JANUVIA) 100 mg tablet Take 1 Tab by mouth.  triamterene-hydroCHLOROthiazide (DYAZIDE) 37.5-25 mg per capsule     cholecalciferol (VITAMIN D3) 1,000 unit cap Take 2,000 Units by mouth.  fexofenadine (ALLEGRA) 180 mg tablet Take 1 Tab by mouth. No current facility-administered medications for this visit. SYSTEM REVIEW NOT RELATED TO LIVER DISEASE OR REVIEWED ABOVE:  Constitution systems: Negative for fever, chills, weight gain, weight loss. Eyes: Negative for visual changes. ENT: Negative for sore throat, painful swallowing. Respiratory: Negative for cough, hemoptysis, SOB. Cardiology: Negative for chest pain, palpitations. GI:  Negative for constipation or diarrhea.   : Negative for urinary frequency, dysuria, hematuria, nocturia. Skin: Negative for rash. Hematology: Negative for easy bruising, blood clots. Musculo-skelatal: Negative for back pain, muscle pain, weakness. Neurologic: Negative for headaches, dizziness, vertigo, memory problems not related to HE. Psychology: Negative for anxiety, depression. FAMILY HISTORY:  The father  of CVA. The mother  of pancreas cancer. There is no family history of liver disease. SOCIAL HISTORY:  The patient is . The patient has 2 children, and 2 grandchildren. The patient has never used tobacco products. The patient has never consumed significant amounts of alcohol. The patient is retired. PHYSICAL EXAMINATION:  Visit Vitals  /75 (BP 1 Location: Right arm, BP Patient Position: Sitting)   Pulse 64   Temp 96.7 °F (35.9 °C)   Ht 5' 1\" (1.549 m)   Wt 149 lb 9.6 oz (67.9 kg)   SpO2 99%   BMI 28.27 kg/m²     General: No acute distress. Eyes: Sclera anicteric. ENT: No oral lesions. Thyroid normal.  Nodes: No adenopathy. Skin: No spider angiomata. No jaundice. No palmar erythema. Respiratory: Lungs clear to auscultation. Cardiovascular: Regular heart rate. No murmurs. No JVD. Abdomen: Soft non-tender. Liver size normal to percussion/palpation. Spleen not palpable. No obvious ascites. Extremities: No edema. No muscle wasting. No gross arthritic changes. Neurologic: Alert and oriented. Cranial nerves grossly intact. No asterixis.     LABORATORY STUDIES:  [facility] [place] of [address] [place] [address] 10/11/2018   WBC 4.6 - 13.2 K/uL 5.7   ANC 1.8 - 8.0 K/UL 3.3   HGB 12.0 - 16.0 g/dL 13.1    - 420 K/uL 211   AST 15 - 37 U/L 19   ALT 13 - 56 U/L 47   Alk Phos 45 - 117 U/L 70   Bili, Total 0.2 - 1.0 MG/DL 0.2   Bili, Direct 0.0 - 0.2 MG/DL 0.1   Albumin 3.4 - 5.0 g/dL 4.1   BUN 7.0 - 18 MG/DL 11   Creat 0.6 - 1.3 MG/DL 0.68   Na 136 - 145 mmol/L 143   K 3.5 - 5.5 mmol/L 4.4   Cl 100 - 108 mmol/L 104   CO2 21 - 32 mmol/L 31   Glucose 74 - 99 mg/dL 83     From 5/2018  AST/ALT/ALP/T Bili/ALB:  29/50/65/0.5/4.0  WBC/HB/PLT/INR:  5.1/13.7/197  BUN/CREAT:  12/0.84    SEROLOGIES:  11/2017. HBsAntigen negative, anti-HBcore negative, anti-HBsurface negative, anti-HCV negative, iron saturation 20%, DERIC positive, ASMA negative, AMA negative, alpha-1-antitrypsin 140. LIVER HISTOLOGY:  1/2018.  30% steatosis, mild inflammation. Portal and pericellular fibrosis, stage 2. Biopsy slides not reviewed by MLS. ENDOSCOPIC PROCEDURES:  Not available or performed    RADIOLOGY:  5/2018. Ultrasound of liver. Echogenic consistent with fatty liver. No liver mass lesions. No dilated bile ducts. No ascites. OTHER TESTING:  Not available or performed    Follow-up René Jonas Fran 32 in 1 year for routine monitoring.       Sebastian Mcmillan, ALEXANDRAP-C  [facility] [place] of [address], [address] [place] [address] Oliva Ortiz, [address] [place] [address]   [phone]

## 2019-11-05 ENCOUNTER — HOSPITAL ENCOUNTER (OUTPATIENT)
Dept: ULTRASOUND IMAGING | Age: 69
Discharge: HOME | End: 2019-11-05
Payer: MEDICARE

## 2019-11-05 DIAGNOSIS — K75.81 NASH (NONALCOHOLIC STEATOHEPATITIS): ICD-10-CM

## 2019-11-05 PROCEDURE — 76981 USE PARENCHYMA: CPT

## 2019-11-05 NOTE — PROGRESS NOTES
Please let her know that her ultrasound is unremarkable. No hepatic masses. Elastography suggests a Metavir fibrosis score of F0/F1, normal to mild hepatic fibrosis. Also suggests some fatty liver. Thank you.

## 2020-04-23 ENCOUNTER — VIRTUAL VISIT (OUTPATIENT)
Dept: HEMATOLOGY | Age: 70
End: 2020-04-23

## 2020-04-23 DIAGNOSIS — K75.81 NASH (NONALCOHOLIC STEATOHEPATITIS): Primary | ICD-10-CM

## 2020-04-23 NOTE — PROGRESS NOTES
Shalonda Weeks is a 72 y.o. female evaluated via telephone on 4/23/2020. Consent:  She and/or health care decision maker is aware that she may receive a bill for this telephone service, depending on her insurance coverage, and has provided verbal consent to proceed: Yes      Documentation:  I communicated with the patient and/or health care decision maker about KEARNEY. Details of this discussion including any medical advice provided: Al labs and imaging were reviewed. Elastography was reviewed. We discussed metabolic syndrome. I explained that she has several components of metabolic syndrome including central obesity, HTN, hypercholesterolemia, and diabetes. The patient was counseled regarding diet and exercise to achieve weight loss. The best diet for patients with fatty liver is one very low in carbohydrates and enriched with protein such as an Tyler's program.    The patient was told to consume any food products and drinks containing fructose as this enhances hepatic fat synthesis. I affirm this is a Patient Initiated Episode with an Established Patient who has not had a related appointment within my department in the past 7 days or scheduled within the next 24 hours.     Total Time: minutes: 11-20 minutes    Note: not billable if this call serves to triage the patient into an appointment for the relevant concern      Shayan Jaramillo NP

## 2020-08-10 ENCOUNTER — HOSPITAL ENCOUNTER (EMERGENCY)
Age: 70
Discharge: HOME | End: 2020-08-10
Attending: EMERGENCY MEDICINE
Payer: MEDICARE

## 2020-08-10 ENCOUNTER — APPOINTMENT (OUTPATIENT)
Dept: GENERAL RADIOLOGY | Age: 70
End: 2020-08-10
Attending: EMERGENCY MEDICINE
Payer: MEDICARE

## 2020-08-10 VITALS
HEIGHT: 61 IN | OXYGEN SATURATION: 100 % | SYSTOLIC BLOOD PRESSURE: 167 MMHG | RESPIRATION RATE: 18 BRPM | WEIGHT: 143 LBS | BODY MASS INDEX: 27 KG/M2 | HEART RATE: 87 BPM | DIASTOLIC BLOOD PRESSURE: 70 MMHG | TEMPERATURE: 98.7 F

## 2020-08-10 DIAGNOSIS — M54.2 NECK PAIN: Primary | ICD-10-CM

## 2020-08-10 DIAGNOSIS — I10 ESSENTIAL HYPERTENSION: ICD-10-CM

## 2020-08-10 LAB
ALBUMIN SERPL-MCNC: 4.2 G/DL (ref 3.4–5)
ALBUMIN/GLOB SERPL: 1.2 {RATIO} (ref 0.8–1.7)
ALP SERPL-CCNC: 77 U/L (ref 45–117)
ALT SERPL-CCNC: 37 U/L (ref 13–56)
ANION GAP SERPL CALC-SCNC: 4 MMOL/L (ref 3–18)
APPEARANCE UR: CLEAR
AST SERPL-CCNC: 11 U/L (ref 10–38)
BASOPHILS # BLD: 0 K/UL (ref 0–0.1)
BASOPHILS NFR BLD: 0 % (ref 0–3)
BILIRUB SERPL-MCNC: 0.3 MG/DL (ref 0.2–1)
BILIRUB UR QL: NEGATIVE
BUN SERPL-MCNC: 16 MG/DL (ref 7–18)
BUN/CREAT SERPL: 18 (ref 12–20)
CALCIUM SERPL-MCNC: 9.6 MG/DL (ref 8.5–10.1)
CHLORIDE SERPL-SCNC: 106 MMOL/L (ref 100–111)
CK MB CFR SERPL CALC: NORMAL % (ref 0–4)
CK MB SERPL-MCNC: <1 NG/ML (ref 5–25)
CK SERPL-CCNC: 71 U/L (ref 26–192)
CO2 SERPL-SCNC: 31 MMOL/L (ref 21–32)
COLOR UR: YELLOW
CREAT SERPL-MCNC: 0.87 MG/DL (ref 0.6–1.3)
DIFFERENTIAL METHOD BLD: ABNORMAL
EOSINOPHIL # BLD: 0.1 K/UL (ref 0–0.4)
EOSINOPHIL NFR BLD: 2 % (ref 0–5)
ERYTHROCYTE [DISTWIDTH] IN BLOOD BY AUTOMATED COUNT: 16.2 % (ref 11.6–14.5)
GLOBULIN SER CALC-MCNC: 3.6 G/DL (ref 2–4)
GLUCOSE SERPL-MCNC: 87 MG/DL (ref 74–99)
GLUCOSE UR STRIP.AUTO-MCNC: NEGATIVE MG/DL
HCT VFR BLD AUTO: 39.4 % (ref 35–45)
HGB BLD-MCNC: 12.7 G/DL (ref 12–16)
HGB UR QL STRIP: NEGATIVE
KETONES UR QL STRIP.AUTO: NEGATIVE MG/DL
LEUKOCYTE ESTERASE UR QL STRIP.AUTO: NEGATIVE
LYMPHOCYTES # BLD: 2.7 K/UL (ref 0.8–3.5)
LYMPHOCYTES NFR BLD: 43 % (ref 20–51)
MAGNESIUM SERPL-MCNC: 2.1 MG/DL (ref 1.6–2.6)
MCH RBC QN AUTO: 21.2 PG (ref 24–34)
MCHC RBC AUTO-ENTMCNC: 32.2 G/DL (ref 31–37)
MCV RBC AUTO: 65.9 FL (ref 74–97)
MONOCYTES # BLD: 0.5 K/UL (ref 0–1)
MONOCYTES NFR BLD: 8 % (ref 2–9)
NEUTS SEG # BLD: 2.9 K/UL (ref 1.8–8)
NEUTS SEG NFR BLD: 47 % (ref 42–75)
NITRITE UR QL STRIP.AUTO: NEGATIVE
PH UR STRIP: 8 [PH] (ref 5–8)
PLATELET # BLD AUTO: 244 K/UL (ref 135–420)
PLATELET COMMENTS,PCOM: ABNORMAL
PMV BLD AUTO: 12.4 FL (ref 9.2–11.8)
POTASSIUM SERPL-SCNC: 3.1 MMOL/L (ref 3.5–5.5)
PROT SERPL-MCNC: 7.8 G/DL (ref 6.4–8.2)
PROT UR STRIP-MCNC: NEGATIVE MG/DL
RBC # BLD AUTO: 5.98 M/UL (ref 4.2–5.3)
RBC MORPH BLD: ABNORMAL
RBC MORPH BLD: ABNORMAL
SODIUM SERPL-SCNC: 141 MMOL/L (ref 136–145)
SP GR UR REFRACTOMETRY: <1.005 (ref 1–1.03)
TROPONIN I SERPL-MCNC: <0.02 NG/ML (ref 0–0.04)
UROBILINOGEN UR QL STRIP.AUTO: 0.2 EU/DL (ref 0.2–1)
WBC # BLD AUTO: 6.2 K/UL (ref 4.6–13.2)

## 2020-08-10 PROCEDURE — 80053 COMPREHEN METABOLIC PANEL: CPT

## 2020-08-10 PROCEDURE — 93005 ELECTROCARDIOGRAM TRACING: CPT

## 2020-08-10 PROCEDURE — 81003 URINALYSIS AUTO W/O SCOPE: CPT

## 2020-08-10 PROCEDURE — 83735 ASSAY OF MAGNESIUM: CPT

## 2020-08-10 PROCEDURE — 99285 EMERGENCY DEPT VISIT HI MDM: CPT

## 2020-08-10 PROCEDURE — 82550 ASSAY OF CK (CPK): CPT

## 2020-08-10 PROCEDURE — 85025 COMPLETE CBC W/AUTO DIFF WBC: CPT

## 2020-08-10 PROCEDURE — 71045 X-RAY EXAM CHEST 1 VIEW: CPT

## 2020-08-10 NOTE — DISCHARGE INSTRUCTIONS
Patient Education        Back Pain: Care Instructions  Your Care Instructions     Back pain has many possible causes. It is often related to problems with muscles and ligaments of the back. It may also be related to problems with the nerves, discs, or bones of the back. Moving, lifting, standing, sitting, or sleeping in an awkward way can strain the back. Sometimes you don't notice the injury until later. Arthritis is another common cause of back pain. Although it may hurt a lot, back pain usually improves on its own within several weeks. Most people recover in 12 weeks or less. Using good home treatment and being careful not to stress your back can help you feel better sooner. Follow-up care is a key part of your treatment and safety. Be sure to make and go to all appointments, and call your doctor if you are having problems. It's also a good idea to know your test results and keep a list of the medicines you take. How can you care for yourself at home? · Sit or lie in positions that are most comfortable and reduce your pain. Try one of these positions when you lie down:  ? Lie on your back with your knees bent and supported by large pillows. ? Lie on the floor with your legs on the seat of a sofa or chair. ? Lie on your side with your knees and hips bent and a pillow between your legs. ? Lie on your stomach if it does not make pain worse. · Do not sit up in bed, and avoid soft couches and twisted positions. Bed rest can help relieve pain at first, but it delays healing. Avoid bed rest after the first day of back pain. · Change positions every 30 minutes. If you must sit for long periods of time, take breaks from sitting. Get up and walk around, or lie in a comfortable position. · Try using a heating pad on a low or medium setting for 15 to 20 minutes every 2 or 3 hours. Try a warm shower in place of one session with the heating pad. · You can also try an ice pack for 10 to 15 minutes every 2 to 3 hours. Put a thin cloth between the ice pack and your skin. · Take pain medicines exactly as directed. ? If the doctor gave you a prescription medicine for pain, take it as prescribed. ? If you are not taking a prescription pain medicine, ask your doctor if you can take an over-the-counter medicine. · Take short walks several times a day. You can start with 5 to 10 minutes, 3 or 4 times a day, and work up to longer walks. Walk on level surfaces and avoid hills and stairs until your back is better. · Return to work and other activities as soon as you can. Continued rest without activity is usually not good for your back. · To prevent future back pain, do exercises to stretch and strengthen your back and stomach. Learn how to use good posture, safe lifting techniques, and proper body mechanics. When should you call for help? Call your doctor now or seek immediate medical care if:  · You have new or worsening numbness in your legs. · You have new or worsening weakness in your legs. (This could make it hard to stand up.)  · You lose control of your bladder or bowels. Watch closely for changes in your health, and be sure to contact your doctor if:  · You have a fever, lose weight, or don't feel well. · You do not get better as expected. Where can you learn more? Go to http://maryse-bravo.info/  Enter I594 in the search box to learn more about \"Back Pain: Care Instructions. \"  Current as of: March 2, 2020               Content Version: 12.5  © 6994-6616 Healthwise, Incorporated. Care instructions adapted under license by [employer] (which disclaims liability or warranty for this information). If you have questions about a medical condition or this instruction, always ask your healthcare professional. [place] [address] any warranty or liability for your use of this information. Patient Education        Home Blood Pressure Test: About This Test  What is it?     A home blood pressure test allows you to keep track of your blood pressure at home. Blood pressure is a measure of the force of blood against the walls of your arteries. Blood pressure readings include two numbers, such as 130/80 (say \"130 over 80\"). The first number is the systolic pressure. The second number is the diastolic pressure. Why is this test done? You may do this test at home to:  · Find out if you have high blood pressure. · Track your blood pressure if you have high blood pressure. · Track how well medicine is working to reduce high blood pressure. · Check how lifestyle changes, such as weight loss and exercise, are affecting blood pressure. How do you prepare for the test?  For at least 30 minutes before you take your blood pressure, don't exercise or use caffeine, tobacco, or medicines that raise blood pressure. Take your blood pressure while you feel comfortable and relaxed. Sit quietly with both feet on the floor for at least 5 minutes before the test.  How is the test done? · Sit with your arm slightly bent and resting on a table so that your upper arm is at the same level as your heart. · Roll up your sleeve or take off your shirt to expose your upper arm. · Wrap the blood pressure cuff around your upper arm so that the lower edge of the cuff is about 1 inch above the bend of your elbow. Proceed with the following steps depending on if you are using an automatic or manual pressure monitor. Automatic blood pressure monitors  · Press the on/off button on the automatic monitor and wait until the ready-to-measure \"heart\" symbol appears next to zero in the display window. · Press the start button. The cuff will inflate and deflate by itself. · Your blood pressure numbers will appear on the screen. · Write your numbers in your log book, along with the date and time.   Manual blood pressure monitors  · Place the earpieces of a stethoscope in your ears, and place the bell of the stethoscope over the artery, just below the cuff. · Close the valve on the rubber inflating bulb. · Squeeze the bulb rapidly with your opposite hand to inflate the cuff until the dial or column of mercury reads about 30 mm Hg higher than your usual systolic pressure. If you do not know your usual pressure, inflate the cuff to 210 mm Hg or until the pulse at your wrist disappears. · Open the pressure valve just slightly by twisting or pressing the valve on the bulb. · As you watch the pressure slowly fall, note the level on the dial at which you first start to hear a pulsing or tapping sound through the stethoscope. This is your systolic blood pressure. · Continue letting the air out slowly. The sounds will become muffled and will finally disappear. Note the pressure when the sounds completely disappear. This is your diastolic blood pressure. Let out all the remaining air. · Write your numbers in your log book, along with the date and time. Follow-up care is a key part of your treatment and safety. Be sure to make and go to all appointments, and call your doctor if you are having problems. It's also a good idea to keep a list of the medicines you take. Where can you learn more? Go to http://www.[employer]com/  Enter C427 in the search box to learn more about \"Home Blood Pressure Test: About This Test.\"  Current as of: December 16, 2019               Content Version: 12.5  © 4480-8282 Healthwise, Incorporated. Care instructions adapted under license by "[employer] (which disclaims liability or warranty for this information). If you have questions about a medical condition or this instruction, always ask your healthcare professional. [address] any warranty or liability for your use of this information.

## 2020-08-10 NOTE — ED TRIAGE NOTES
Pt took BP at home and it was high. Pt went to [facility] and again her BP read high so she asked to be transported here for further evaluation.

## 2020-08-10 NOTE — ED PROVIDER NOTES
EMERGENCY DEPARTMENT HISTORY AND PHYSICAL EXAM    Date: 8/10/2020  Patient Name: Nena Butterfield    History of Presenting Illness     Chief Complaint   Patient presents with    Hypertension       History Provided By: Patient     History Zuleika Villarreal):   2:47 PM  Nena Butterfield is a 72 y.o. female with PMHX of Diabetes, hypertension, liver disease who presents to the emergency department C/O right neck pain onset 1 hour ago. Associated sxs include HTN. Pt denies chest pain, shortness of breath, any other sxs or complaints. Patient states that she had not taken her blood pressure medicine at the normal time today. She was distracted when she and her  were running around. When she got home she noticed that she had not taken the pill out of the daily pill container. She took her medicine and then took her blood pressure which was markedly elevated. She then drove herself to the [facility] and they brought her to the ED. Chief Complaint: Neck pain  Duration: 1 hour   Timing:  acute  Location: right lateral neck  Quality: Sharp  Severity: Moderate  Modifying Factors: Nothing makes it better, or worse. Associated Symptoms: HTN    PCP: Zee Garay, DO     Current Outpatient Medications   Medication Sig Dispense Refill    aspirin 81 mg chewable tablet Take 81 mg by mouth daily.  amLODIPine (NORVASC) 10 mg tablet Take 10 mg by mouth.  fexofenadine (ALLEGRA) 180 mg tablet Take 1 Tab by mouth.  glipiZIDE SR (GLUCOTROL XL) 5 mg CR tablet Take 1 Tab by mouth.  simvastatin (ZOCOR) 20 mg tablet Take 1 Tab by mouth.  SITagliptin (JANUVIA) 100 mg tablet Take 1 Tab by mouth.  triamterene-hydroCHLOROthiazide (DYAZIDE) 37.5-25 mg per capsule   0    cholecalciferol (VITAMIN D3) 1,000 unit cap Take 2,000 Units by mouth.          Past History     Past Medical History:  Past Medical History:   Diagnosis Date    Diabetes ([address]     Hypertension     Liver disease        Past Surgical History:  Past Surgical History:   Procedure Laterality Date    HX HEENT      upper wisdom teeth    HX HYSTERECTOMY      HX ORTHOPAEDIC      tumors in hand       Family History:  History reviewed. No pertinent family history. Social History:  Social History     Tobacco Use    Smoking status: Never Smoker    Smokeless tobacco: Never Used   Substance Use Topics    Alcohol use: No    Drug use: Never       Allergies: Allergies   Allergen Reactions    Sulfasalazine Rash    Lotrel [Amlodipine-Benazepril] Cough         Review of Systems   Review of Systems   Constitutional: Negative for chills and fever. Respiratory: Negative for shortness of breath. Cardiovascular: Negative for chest pain. Gastrointestinal: Negative for abdominal pain, nausea and vomiting. Musculoskeletal: Positive for neck pain. All other systems reviewed and are negative. Physical Exam     Vitals:    08/10/20 1442 08/10/20 1445 08/10/20 1500 08/10/20 1515   BP:  159/71 160/65 137/68   Pulse:  63 60 65   Resp:  19 15 8   Temp:       SpO2: 100% 99% 100% 99%   Weight:       Height:         Physical Exam  Vitals signs and nursing note reviewed. Vital signs and nursing notes reviewed  CONSTITUTIONAL: Alert, in no apparent distress; well-developed; well-nourished. HEAD:  Normocephalic, atraumatic  EYES: PERRL; EOM's intact. ENTM: Nose: no rhinorrhea; Throat: no erythema or exudate, mucous membranes moist  Neck:  No JVD, supple without lymphadenopathy, no bruits. RESP: Chest clear, equal breath sounds. CV: S1 and S2 WNL; No murmurs, gallops or rubs. GI: Normal bowel sounds, abdomen soft and non-tender. No masses or organomegaly. : No costo-vertebral angle tenderness. BACK:  Non-tender  UPPER EXT:  Normal inspection  LOWER EXT: No edema, no calf tenderness. Distal pulses intact. NEURO: CN intact, reflexes 2/4 and sym, strength 5/5 and sym, sensation intact. SKIN: No rashes; Normal for age and stage.   PSYCH:  Alert and oriented, normal affect. Diagnostic Study Results     Labs -     Recent Results (from the past 12 hour(s))   URINALYSIS W/ RFLX MICROSCOPIC    Collection Time: 08/10/20  2:32 PM   Result Value Ref Range    Color YELLOW      Appearance CLEAR      Specific gravity <1.005 (L) 1.005 - 1.030    pH (UA) 8.0 5.0 - 8.0      Protein Negative NEG mg/dL    Glucose Negative NEG mg/dL    Ketone Negative NEG mg/dL    Bilirubin Negative NEG      Blood Negative NEG      Urobilinogen 0.2 0.2 - 1.0 EU/dL    Nitrites Negative NEG      Leukocyte Esterase Negative NEG     EKG, 12 LEAD, INITIAL    Collection Time: 08/10/20  2:34 PM   Result Value Ref Range    Ventricular Rate 61 BPM    Atrial Rate 61 BPM    P-R Interval 202 ms    QRS Duration 72 ms    Q-T Interval 428 ms    QTC Calculation (Bezet) 430 ms    Calculated P Axis 62 degrees    Calculated R Axis -14 degrees    Calculated T Axis 4 degrees    Diagnosis       Normal sinus rhythm with sinus arrhythmia  Normal ECG  No previous ECGs available     CBC WITH AUTOMATED DIFF    Collection Time: 08/10/20  2:35 PM   Result Value Ref Range    WBC 6.2 4.6 - 13.2 K/uL    RBC 5.98 (H) 4.20 - 5.30 M/uL    HGB 12.7 12.0 - 16.0 g/dL    HCT 39.4 35.0 - 45.0 %    MCV 65.9 (L) 74.0 - 97.0 FL    MCH 21.2 (L) 24.0 - 34.0 PG    MCHC 32.2 31.0 - 37.0 g/dL    RDW 16.2 (H) 11.6 - 14.5 %    PLATELET 187 770 - 769 K/uL    MPV 12.4 (H) 9.2 - 11.8 FL    NEUTROPHILS 47 42 - 75 %    LYMPHOCYTES 43 20 - 51 %    MONOCYTES 8 2 - 9 %    EOSINOPHILS 2 0 - 5 %    BASOPHILS 0 0 - 3 %    ABS. NEUTROPHILS 2.9 1.8 - 8.0 K/UL    ABS. LYMPHOCYTES 2.7 0.8 - 3.5 K/UL    ABS. MONOCYTES 0.5 0 - 1.0 K/UL    ABS. EOSINOPHILS 0.1 0.0 - 0.4 K/UL    ABS.  BASOPHILS 0.0 0.0 - 0.1 K/UL    PLATELET COMMENTS ONE PLUS LARGE PLATELETS     RBC COMMENTS ANISOCYTOSIS  1+        RBC COMMENTS MICROCYTOSIS  1+        DF MANUAL     METABOLIC PANEL, COMPREHENSIVE    Collection Time: 08/10/20  2:35 PM   Result Value Ref Range    Sodium 141 136 - 145 mmol/L    Potassium 3.1 (L) 3.5 - 5.5 mmol/L    Chloride 106 100 - 111 mmol/L    CO2 31 21 - 32 mmol/L    Anion gap 4 3.0 - 18 mmol/L    Glucose 87 74 - 99 mg/dL    BUN 16 7.0 - 18 MG/DL    Creatinine 0.87 0.6 - 1.3 MG/DL    BUN/Creatinine ratio 18 12 - 20      GFR est AA >60 >60 ml/min/1.73m2    GFR est non-AA >60 >60 ml/min/1.73m2    Calcium 9.6 8.5 - 10.1 MG/DL    Bilirubin, total 0.3 0.2 - 1.0 MG/DL    ALT (SGPT) 37 13 - 56 U/L    AST (SGOT) 11 10 - 38 U/L    Alk. phosphatase 77 45 - 117 U/L    Protein, total 7.8 6.4 - 8.2 g/dL    Albumin 4.2 3.4 - 5.0 g/dL    Globulin 3.6 2.0 - 4.0 g/dL    A-G Ratio 1.2 0.8 - 1.7     CARDIAC PANEL,(CK, CKMB & TROPONIN)    Collection Time: 08/10/20  2:35 PM   Result Value Ref Range    CK - MB <1.0 <3.6 ng/ml    CK-MB Index  0.0 - 4.0 %     CALCULATION NOT PERFORMED WHEN RESULT IS BELOW LINEAR LIMIT    CK 71 26 - 192 U/L    Troponin-I, QT <0.02 0.0 - 0.045 NG/ML   MAGNESIUM    Collection Time: 08/10/20  2:35 PM   Result Value Ref Range    Magnesium 2.1 1.6 - 2.6 mg/dL       Radiologic Studies -   XR CHEST PORT   Final Result   IMPRESSION:      No acute radiographic cardiopulmonary abnormality. CT Results  (Last 48 hours)    None        CXR Results  (Last 48 hours)               08/10/20 1526  XR CHEST PORT Final result    Impression:  IMPRESSION:       No acute radiographic cardiopulmonary abnormality. Narrative:  EXAM: XR CHEST PORT       CLINICAL INDICATION/HISTORY: chest pain   -Additional: None       COMPARISON: None       TECHNIQUE: Frontal view of the chest       _______________       FINDINGS:       HEART AND MEDIASTINUM: Normal cardiac size and mediastinal contours. LUNGS AND PLEURAL SPACES: No focal pneumonic consolidation, pneumothorax, or   pleural effusion.        BONY THORAX AND SOFT TISSUES: No acute osseous abnormality       _______________                 Medications given in the ED-  Medications - No data to display      Medical Decision Making   I am the first provider for this patient. I reviewed the vital signs, available nursing notes, past medical history, past surgical history, family history and social history. Vital Signs-Reviewed the patient's vital signs. Pulse Oximetry Analysis - 100% on RA     Cardiac Monitor:  Rate: 67 bpm  Rhythm: NSR    EKG interpretation: (Preliminary)  Rhythm: NSR. Rate: 61 bpm; no STEMI  EKG read by Lata Bryan MD at 2:34 PM     Records Reviewed: Nursing Notes    Provider Notes (Medical Decision Making):   DDX: Headache, neck pain, hypertension    Procedures:  Procedures    ED Course:   2:47 PM Initial assessment performed. The patients presenting problems have been discussed, and they are in agreement with the care plan formulated and outlined with them. I have encouraged them to ask questions as they arise throughout their visit. Diagnosis and Disposition     Discussion:  72 y.o. female with primary hypertension who did not take her meds today. Blood pressure was resumed after a reasonable time after she took her meds prior to coming in. Her neck pain is also resolved. Patient to follow-up with her primary care doctor. Patient understands and agrees with this plan. DISCHARGE NOTE:  4:47 PM   Tom Munoz's  results have been reviewed with her. She has been counseled regarding her diagnosis, treatment, and plan. She verbally conveys understanding and agreement of the signs, symptoms, diagnosis, treatment and prognosis and additionally agrees to follow up as discussed. She also agrees with the care-plan and conveys that all of her questions have been answered. I have also provided discharge instructions for her that include: educational information regarding their diagnosis and treatment, and list of reasons why they would want to return to the ED prior to their follow-up appointment, should her condition change.  She has been provided with education for proper emergency department utilization. CLINICAL IMPRESSION:    1. Neck pain    2. Essential hypertension        PLAN:  1. D/C Home  2. Current Discharge Medication List      CONTINUE these medications which have NOT CHANGED    Details   aspirin 81 mg chewable tablet Take 81 mg by mouth daily. amLODIPine (NORVASC) 10 mg tablet Take 10 mg by mouth. fexofenadine (ALLEGRA) 180 mg tablet Take 1 Tab by mouth. glipiZIDE SR (GLUCOTROL XL) 5 mg CR tablet Take 1 Tab by mouth. simvastatin (ZOCOR) 20 mg tablet Take 1 Tab by mouth. SITagliptin (JANUVIA) 100 mg tablet Take 1 Tab by mouth.      triamterene-hydroCHLOROthiazide (DYAZIDE) 37.5-25 mg per capsule Refills: 0      cholecalciferol (VITAMIN D3) 1,000 unit cap Take 2,000 Units by mouth. 3.   Follow-up Information     Follow up With Specialties Details Why Leticia Mckinley, DO Internal Medicine Schedule an appointment as soon as possible for a visit in 2 days For follow up from Emergency Department visit. [address] [facility]      [facility] EMERGENCY DEPT Emergency Medicine  As needed; If symptoms worsen [address] [place] [address] [address]     Please note that this dictation was completed with [employer], the computer voice recognition software. Quite often unanticipated grammatical, syntax, homophones, and other interpretive errors are inadvertently transcribed by the computer software. Please disregard these errors. Please excuse any errors that have escaped final proofreading.     Rupal Sethi MD

## 2020-08-13 LAB
ATRIAL RATE: 61 BPM
CALCULATED P AXIS, ECG09: 62 DEGREES
CALCULATED R AXIS, ECG10: -14 DEGREES
CALCULATED T AXIS, ECG11: 4 DEGREES
DIAGNOSIS, 93000: NORMAL
P-R INTERVAL, ECG05: 202 MS
Q-T INTERVAL, ECG07: 428 MS
QRS DURATION, ECG06: 72 MS
QTC CALCULATION (BEZET), ECG08: 430 MS
VENTRICULAR RATE, ECG03: 61 BPM

## 2020-10-27 ENCOUNTER — HOSPITAL ENCOUNTER (OUTPATIENT)
Dept: LAB | Age: 70
Discharge: HOME | End: 2020-10-27
Payer: MEDICARE

## 2020-10-27 ENCOUNTER — OFFICE VISIT (OUTPATIENT)
Dept: HEMATOLOGY | Age: 70
End: 2020-10-27
Payer: MEDICARE

## 2020-10-27 VITALS
WEIGHT: 145 LBS | BODY MASS INDEX: 27.38 KG/M2 | SYSTOLIC BLOOD PRESSURE: 134 MMHG | HEIGHT: 61 IN | TEMPERATURE: 97 F | DIASTOLIC BLOOD PRESSURE: 73 MMHG | RESPIRATION RATE: 17 BRPM | HEART RATE: 82 BPM | OXYGEN SATURATION: 99 %

## 2020-10-27 DIAGNOSIS — K75.81 NASH (NONALCOHOLIC STEATOHEPATITIS): ICD-10-CM

## 2020-10-27 DIAGNOSIS — K75.81 NASH (NONALCOHOLIC STEATOHEPATITIS): Primary | ICD-10-CM

## 2020-10-27 LAB
ALBUMIN SERPL-MCNC: 3.9 G/DL (ref 3.4–5)
ALBUMIN/GLOB SERPL: 1.1 {RATIO} (ref 0.8–1.7)
ALP SERPL-CCNC: 78 U/L (ref 45–117)
ALT SERPL-CCNC: 37 U/L (ref 13–56)
ANION GAP SERPL CALC-SCNC: 5 MMOL/L (ref 3–18)
AST SERPL-CCNC: 17 U/L (ref 10–38)
BASOPHILS # BLD: 0 K/UL (ref 0–0.1)
BASOPHILS NFR BLD: 0 % (ref 0–2)
BILIRUB DIRECT SERPL-MCNC: <0.1 MG/DL (ref 0–0.2)
BILIRUB SERPL-MCNC: 0.2 MG/DL (ref 0.2–1)
BUN SERPL-MCNC: 15 MG/DL (ref 7–18)
BUN/CREAT SERPL: 15 (ref 12–20)
CALCIUM SERPL-MCNC: 9.6 MG/DL (ref 8.5–10.1)
CHLORIDE SERPL-SCNC: 102 MMOL/L (ref 100–111)
CO2 SERPL-SCNC: 32 MMOL/L (ref 21–32)
CREAT SERPL-MCNC: 0.98 MG/DL (ref 0.6–1.3)
DIFFERENTIAL METHOD BLD: ABNORMAL
EOSINOPHIL # BLD: 0.1 K/UL (ref 0–0.4)
EOSINOPHIL NFR BLD: 1 % (ref 0–5)
ERYTHROCYTE [DISTWIDTH] IN BLOOD BY AUTOMATED COUNT: 15.4 % (ref 11.6–14.5)
GLOBULIN SER CALC-MCNC: 3.6 G/DL (ref 2–4)
GLUCOSE SERPL-MCNC: 189 MG/DL (ref 74–99)
HCT VFR BLD AUTO: 40.2 % (ref 35–45)
HGB BLD-MCNC: 12.7 G/DL (ref 12–16)
LYMPHOCYTES # BLD: 2.2 K/UL (ref 0.9–3.6)
LYMPHOCYTES NFR BLD: 35 % (ref 21–52)
MCH RBC QN AUTO: 21.4 PG (ref 24–34)
MCHC RBC AUTO-ENTMCNC: 31.6 G/DL (ref 31–37)
MCV RBC AUTO: 67.7 FL (ref 74–97)
MONOCYTES # BLD: 0.4 K/UL (ref 0.05–1.2)
MONOCYTES NFR BLD: 6 % (ref 3–10)
NEUTS SEG # BLD: 3.5 K/UL (ref 1.8–8)
NEUTS SEG NFR BLD: 58 % (ref 40–73)
PLATELET # BLD AUTO: 260 K/UL (ref 135–420)
PLATELET COMMENTS,PCOM: ABNORMAL
POTASSIUM SERPL-SCNC: 3.7 MMOL/L (ref 3.5–5.5)
PROT SERPL-MCNC: 7.5 G/DL (ref 6.4–8.2)
RBC # BLD AUTO: 5.94 M/UL (ref 4.2–5.3)
RBC MORPH BLD: ABNORMAL
SODIUM SERPL-SCNC: 139 MMOL/L (ref 136–145)
WBC # BLD AUTO: 6.2 K/UL (ref 4.6–13.2)

## 2020-10-27 PROCEDURE — 80048 BASIC METABOLIC PNL TOTAL CA: CPT

## 2020-10-27 PROCEDURE — G0463 HOSPITAL OUTPT CLINIC VISIT: HCPCS | Performed by: NURSE PRACTITIONER

## 2020-10-27 PROCEDURE — G8752 SYS BP LESS 140: HCPCS | Performed by: NURSE PRACTITIONER

## 2020-10-27 PROCEDURE — G8536 NO DOC ELDER MAL SCRN: HCPCS | Performed by: NURSE PRACTITIONER

## 2020-10-27 PROCEDURE — G8400 PT W/DXA NO RESULTS DOC: HCPCS | Performed by: NURSE PRACTITIONER

## 2020-10-27 PROCEDURE — G8754 DIAS BP LESS 90: HCPCS | Performed by: NURSE PRACTITIONER

## 2020-10-27 PROCEDURE — 1101F PT FALLS ASSESS-DOCD LE1/YR: CPT | Performed by: NURSE PRACTITIONER

## 2020-10-27 PROCEDURE — 85025 COMPLETE CBC W/AUTO DIFF WBC: CPT

## 2020-10-27 PROCEDURE — 80076 HEPATIC FUNCTION PANEL: CPT

## 2020-10-27 PROCEDURE — 36415 COLL VENOUS BLD VENIPUNCTURE: CPT

## 2020-10-27 PROCEDURE — G8432 DEP SCR NOT DOC, RNG: HCPCS | Performed by: NURSE PRACTITIONER

## 2020-10-27 PROCEDURE — 1090F PRES/ABSN URINE INCON ASSESS: CPT | Performed by: NURSE PRACTITIONER

## 2020-10-27 PROCEDURE — 99213 OFFICE O/P EST LOW 20 MIN: CPT | Performed by: NURSE PRACTITIONER

## 2020-10-27 PROCEDURE — 3017F COLORECTAL CA SCREEN DOC REV: CPT | Performed by: NURSE PRACTITIONER

## 2020-10-27 PROCEDURE — G8427 DOCREV CUR MEDS BY ELIG CLIN: HCPCS | Performed by: NURSE PRACTITIONER

## 2020-10-27 PROCEDURE — G8419 CALC BMI OUT NRM PARAM NOF/U: HCPCS | Performed by: NURSE PRACTITIONER

## 2020-10-27 RX ORDER — LIDOCAINE 50 MG/G
1 PATCH TOPICAL DAILY
COMMUNITY
Start: 2019-12-24 | End: 2020-12-23

## 2020-10-27 RX ORDER — FLUTICASONE PROPIONATE 50 MCG
2 SPRAY, SUSPENSION (ML) NASAL DAILY
COMMUNITY
Start: 2020-08-03 | End: 2022-06-22

## 2020-10-27 RX ORDER — METFORMIN HYDROCHLORIDE 500 MG/1
500 TABLET, EXTENDED RELEASE ORAL 2 TIMES DAILY
COMMUNITY
Start: 2020-08-03 | End: 2021-08-03

## 2020-10-27 RX ORDER — LANOLIN ALCOHOL/MO/W.PET/CERES
1000 CREAM (GRAM) TOPICAL DAILY
COMMUNITY
End: 2022-06-22

## 2020-10-27 NOTE — PROGRESS NOTES
[address] [place] [address], MD, Pream Fonseca MD, MPH      Jorge Miles, STEPHANIE Hutchison [facility]-RICHIE [facility]   Aubree Kendall CLARA Marcano [facility]       Willie Amador [address]    at [address], [address] [place] [address], [place] [address].    [phone]    FAX: [address] [place] [address], [address]    [phone]    FAX: [phone]         Patient Care Team:  Davy Jaramillo DO as PCP - General (Internal Medicine)   GI. Román Oconnell [place], [place]      Problem List  Date Reviewed: 10/27/2020          Codes Class Noted    KEARNEY (nonalcoholic steatohepatitis) ICD-10-CM: K75.81  ICD-9-CM: 571.8  6/13/2018        Type II diabetes mellitus ([facility] [address] ICD-10-CM: E11.9  ICD-9-CM: 250.00  6/13/2018        Hypertension ICD-10-CM: I10  ICD-9-CM: 401.9  6/13/2018        Hypercholesteremia ICD-10-CM: E78.00  ICD-9-CM: 272.0  6/13/2018              Sunshine Weller returns to the [employer] today for education and management of KEARNEY. The active problem list, all pertinent past medical history, medications, liver histology, endoscopic studies, radiologic findings and laboratory findings related to the liver disorder were reviewed with the patient. The patient is a 72 y.o.  female who was found to have fatty liver disease on liver biopsy     Serologic evaluation for markers of chronic liver disease were positive for  DERIC. Ultrasound of the liver was most recently performed in 11/2018. The results of the imaging suggested fatty liver disease. No hepatic masses. Shear wave elastography was performed in 11/2018. This suggests a Metavir fibrosis score of F0, normal hepatic fibrosis. A liver biopsy was performed in 1/2018. 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 The patient has no symptoms which can be attributed to the liver disorder. The patient completes all daily activities without any functional limitations. The patient has not experienced fatigue, fevers, chills, shortness of breath, chest pain, pain in the right side over the liver, diffuse abdominal pain, nausea, vomiting, constipation, diarrhrea, dry eyes, dry mouth, arthralgias, myalgias, yellowing of the eyes or skin, itching, dark urine, problems concentrating, swelling of the abdomen, swelling of the lower extremities, hematemesis, or hematochezia. All of the issues listed in the Assessment and Plan were discussed with the patient. All questions were answered. The patient expressed a clear understanding of the above. 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 The most recent laboratory studies indicate that the liver transaminases are normal, alkaline phosphatase is normal, tests of hepatic synthetic and metabolic function are normal, and the platelet count is normal.       The patient had a liver biopsy performed in [facility]. Will request that the liver biopsy slides be sent for review by  [facility]. The patient had shear wave elastography completed in 11/2018. This suggests that the liver is normal and without hepatic fibrosis. Metavir fibrosis score of F0. The elastography and ultrasound will be repeated in 11/2019. We discussed metabolic syndrome. I explained that she has several components of metabolic syndrome including central obesity, HTN, hypercholesterolemia, and diabetes. The patient was counseled regarding diet and exercise to achieve weight loss.   The best diet for patients with fatty liver is one very low in carbohydrates and enriched with protein such as an [employer] program.      The patient was told to consume any food products and drinks containing fructose as this enhances hepatic fat synthesis. There is no medication of vitamin supplements that we advocate for KEARNEY. Using glitazones in patients without diabetes mellitus has been shown to reduce fat content in the liver but has no effect on fibrosis and is associated with weight gain. Vitamin E has also been used but the data is not very good and most experts no longer advocate this. The only medical treatments for KEARNEY are though clinical trials. The patient was offered participation in a clinical trial for treatment of KEARNEY. The need to perform an assessment of liver fibrosis was discussed with the patient. The Fibroscan can assess liver fibrosis and determine if a patient has advanced fibrosis or cirrhosis without the need for liver biopsy. This will be performed at the next office visit. The Fibroscan can be repeated annually or as often as clinically indicated to assess for fibrosis progression and/or regression. Treatment of other medical problems in patients with chronic liver disease  The patient was directed to continue all current medications at the current dosages. There are no contraindications for the patient to take any medications that are necessary for treatment of other medical issues. The patient can take oral diabetic agents for treatment of diabetes and statins for treatment of hypercholesterolemia. This will not impact the patient's current liver disease. The patient does not consume alcohol daily. Normal doses of acetaminophen can be used for pain as needed. Normal doses of acetaminophen as recommended on the label are not hepatotoxic, even in patients with cirrhosis. The patient does not have cirrhosis. Normal doses of NSAIDs can be used for pain as needed.     Counseling for alcohol in patients with chronic liver disease  The patient was counseled regarding alcohol consumption and the effect of alcohol on chronic liver disease. The patient was reminded that alcohol can cause fatty liver. The patient does not consume any significant amount of alcohol. Vaccinations   Vaccination for viral hepatitis B is recommended since the patient has no serologic evidence of previous exposure or vaccination with immunity. The need for vaccination against viral hepatitis A will be assessed with serologic and instituted as appropriate. Routine vaccinations against other bacterial and viral agents can be performed as indicated. Annual flu vaccination should be administered if indicated. Screening for Hepatocellular Carcinoma  HCC screening is not necessary since the patient has no evidence of cirrhosis. ALLERGIES  Allergies   Allergen Reactions    Sulfasalazine Rash    Lotrel [Amlodipine-Benazepril] Cough       MEDICATIONS  Current Outpatient Medications   Medication Sig    fluticasone propionate (FLONASE) 50 mcg/actuation nasal spray 2 Sprays by Nasal route daily.  lidocaine (LIDODERM) 5 % Apply 1 Patch to affected area daily.  metFORMIN ER (GLUCOPHAGE XR) 500 mg tablet Take 500 mg by mouth two (2) times a day.  cyanocobalamin 1,000 mcg tablet Take 1,000 mcg by mouth daily.  aspirin 81 mg chewable tablet Take 81 mg by mouth daily.  amLODIPine (NORVASC) 10 mg tablet Take 10 mg by mouth.  fexofenadine (ALLEGRA) 180 mg tablet Take 1 Tab by mouth.  glipiZIDE SR (GLUCOTROL XL) 5 mg CR tablet Take 5 mg by mouth three (3) times daily.  simvastatin (ZOCOR) 20 mg tablet Take 1 Tab by mouth.  triamterene-hydroCHLOROthiazide (DYAZIDE) 37.5-25 mg per capsule     cholecalciferol (VITAMIN D3) 1,000 unit cap Take 2,000 Units by mouth. No current facility-administered medications for this visit.         SYSTEM REVIEW NOT RELATED TO LIVER DISEASE OR REVIEWED ABOVE:  Constitution systems: Negative for fever, chills, weight gain, weight loss. Eyes: Negative for visual changes. ENT: Negative for sore throat, painful swallowing. Respiratory: Negative for cough, hemoptysis, SOB. Cardiology: Negative for chest pain, palpitations. GI:  Negative for constipation or diarrhea. : Negative for urinary frequency, dysuria, hematuria, nocturia. Skin: Negative for rash. Hematology: Negative for easy bruising, blood clots. Musculo-skelatal: Negative for back pain, muscle pain, weakness. Neurologic: Negative for headaches, dizziness, vertigo, memory problems not related to HE. Psychology: Negative for anxiety, depression. FAMILY HISTORY:  The father  of CVA. The mother  of pancreas cancer. There is no family history of liver disease. SOCIAL HISTORY:  The patient is . The patient has 2 children, and 2 grandchildren. The patient has never used tobacco products. The patient has never consumed significant amounts of alcohol. The patient is retired. PHYSICAL EXAMINATION:  Visit Vitals  /73 (BP 1 Location: Left arm, BP Patient Position: Sitting)   Pulse 82   Temp 97 °F (36.1 °C)   Resp 17   Ht 5' 1\" (1.549 m)   Wt 145 lb (65.8 kg)   SpO2 99%   BMI 27.40 kg/m²     General: No acute distress. Eyes: Sclera anicteric. ENT: No oral lesions. Thyroid normal.  Nodes: No adenopathy. Skin: No spider angiomata. No jaundice. No palmar erythema. Respiratory: Lungs clear to auscultation. Cardiovascular: Regular heart rate. No murmurs. No JVD. Abdomen: Soft non-tender. Liver size normal to percussion/palpation. Spleen not palpable. No obvious ascites. Extremities: No edema. No muscle wasting. No gross arthritic changes. Neurologic: Alert and oriented. Cranial nerves grossly intact. No asterixis.     LABORATORY STUDIES:  Liver [place] of [address] [place] [address] & Units 8/10/2020 2019   WBC 4.6 - 13.2 K/uL 6.2 5.9   ANC 1.8 - 8.0 K/UL 2.9 3.6   HGB 12.0 - 16.0 g/dL 12.7 11.9 (L)    - 420 K/uL 244 227   AST 10 - 38 U/L 11 17   ALT 13 - 56 U/L 37 34   Alk Phos 45 - 117 U/L 77 65   Bili, Total 0.2 - 1.0 MG/DL 0.3 0.2   Bili, Direct 0.0 - 0.2 MG/DL  <0.1   Albumin 3.4 - 5.0 g/dL 4.2 3.9   BUN 7.0 - 18 MG/DL 16 13   Creat 0.6 - 1.3 MG/DL 0.87 0.72   Na 136 - 145 mmol/L 141 143   K 3.5 - 5.5 mmol/L 3.1 (L) 3.6   Cl 100 - 111 mmol/L 106 106   CO2 21 - 32 mmol/L 31 31   Glucose 74 - 99 mg/dL 87 65 (L)   Magnesium 1.6 - 2.6 mg/dL 2.1        SEROLOGIES:  11/2017. HBsAntigen negative, anti-HBcore negative, anti-HBsurface negative, anti-HCV negative, iron saturation 20%, DERIC positive, ASMA negative, AMA negative, alpha-1-antitrypsin 140. LIVER HISTOLOGY:  1/2018.  30% steatosis, mild inflammation. Portal and pericellular fibrosis, stage 2.      11/20018. TRANSIENT HEPATIC ELASTOGRAPHY:   E Range: 4.6-7.05 kPa  E Mean: 5.3 kPa  E Median: 5.1 kPa  E Std: 0.74 kPa    11/2019. Elastography:     > Range 4.50-7.68 kPa    > Mean 5.75 kPa    > Median 5.56 kPa    > Standard deviation 1.0 kPa    ENDOSCOPIC PROCEDURES:  Not available or performed    RADIOLOGY:  5/2018. Ultrasound of liver. Echogenic consistent with fatty liver. No liver mass lesions. No dilated bile ducts. No ascites. 11/2018. Ultrasound of the liver. Mild heterogeneous diffuse increased hepatic parenchymal echotexture, sonographically favoring steatosis. No focal hepatic mass. 11/219. Ultrasound of the liver. Hyperechogenicity of liver parenchyma similar to prior, likely steatosis. No solid mass. OTHER TESTING:  Not available or performed    [address] in 6 months for fibroscan.      SURINDER Georges  Liver [place] of [facility]  [address], [address] [facility], [address] [facility]   [phone]

## 2020-10-27 NOTE — PROGRESS NOTES
1. Have you been to the ER, urgent care clinic since your last visit? Hospitalized since your last visit? Yes, Chest Pains, 04/2020 [facility]    2. Have you seen or consulted any other health care providers outside of the [address] [place] [address] since your last visit? Include any pap smears or colon screening.  No

## 2021-06-22 ENCOUNTER — OFFICE VISIT (OUTPATIENT)
Dept: HEMATOLOGY | Age: 71
End: 2021-06-22
Payer: MEDICARE

## 2021-06-22 ENCOUNTER — HOSPITAL ENCOUNTER (OUTPATIENT)
Dept: LAB | Age: 71
Discharge: HOME | End: 2021-06-22
Payer: MEDICARE

## 2021-06-22 VITALS
TEMPERATURE: 96.7 F | DIASTOLIC BLOOD PRESSURE: 75 MMHG | BODY MASS INDEX: 26.43 KG/M2 | RESPIRATION RATE: 17 BRPM | WEIGHT: 140 LBS | OXYGEN SATURATION: 99 % | SYSTOLIC BLOOD PRESSURE: 140 MMHG | HEART RATE: 68 BPM | HEIGHT: 61 IN

## 2021-06-22 DIAGNOSIS — K75.81 NASH (NONALCOHOLIC STEATOHEPATITIS): Primary | ICD-10-CM

## 2021-06-22 DIAGNOSIS — K75.81 NASH (NONALCOHOLIC STEATOHEPATITIS): ICD-10-CM

## 2021-06-22 LAB
ALBUMIN SERPL-MCNC: 3.9 G/DL (ref 3.4–5)
ALBUMIN/GLOB SERPL: 1.2 {RATIO} (ref 0.8–1.7)
ALP SERPL-CCNC: 69 U/L (ref 45–117)
ALT SERPL-CCNC: 27 U/L (ref 13–56)
ANION GAP SERPL CALC-SCNC: 4 MMOL/L (ref 3–18)
AST SERPL-CCNC: 12 U/L (ref 10–38)
BASOPHILS # BLD: 0 K/UL (ref 0–0.1)
BASOPHILS NFR BLD: 1 % (ref 0–2)
BILIRUB DIRECT SERPL-MCNC: 0.1 MG/DL (ref 0–0.2)
BILIRUB SERPL-MCNC: 0.3 MG/DL (ref 0.2–1)
BUN SERPL-MCNC: 12 MG/DL (ref 7–18)
BUN/CREAT SERPL: 18 (ref 12–20)
CALCIUM SERPL-MCNC: 9.7 MG/DL (ref 8.5–10.1)
CHLORIDE SERPL-SCNC: 108 MMOL/L (ref 100–111)
CO2 SERPL-SCNC: 30 MMOL/L (ref 21–32)
CREAT SERPL-MCNC: 0.66 MG/DL (ref 0.6–1.3)
DIFFERENTIAL METHOD BLD: ABNORMAL
EOSINOPHIL # BLD: 0.1 K/UL (ref 0–0.4)
EOSINOPHIL NFR BLD: 1 % (ref 0–5)
ERYTHROCYTE [DISTWIDTH] IN BLOOD BY AUTOMATED COUNT: 15.9 % (ref 11.6–14.5)
GLOBULIN SER CALC-MCNC: 3.3 G/DL (ref 2–4)
GLUCOSE SERPL-MCNC: 120 MG/DL (ref 74–99)
HCT VFR BLD AUTO: 38 % (ref 35–45)
HGB BLD-MCNC: 11.7 G/DL (ref 12–16)
LYMPHOCYTES # BLD: 1.8 K/UL (ref 0.9–3.6)
LYMPHOCYTES NFR BLD: 35 % (ref 21–52)
MCH RBC QN AUTO: 21.2 PG (ref 24–34)
MCHC RBC AUTO-ENTMCNC: 30.8 G/DL (ref 31–37)
MCV RBC AUTO: 69 FL (ref 74–97)
MONOCYTES # BLD: 0.4 K/UL (ref 0.05–1.2)
MONOCYTES NFR BLD: 7 % (ref 3–10)
NEUTS SEG # BLD: 2.9 K/UL (ref 1.8–8)
NEUTS SEG NFR BLD: 56 % (ref 40–73)
PLATELET # BLD AUTO: 242 K/UL (ref 135–420)
PMV BLD AUTO: 11.8 FL (ref 9.2–11.8)
POTASSIUM SERPL-SCNC: 4 MMOL/L (ref 3.5–5.5)
PROT SERPL-MCNC: 7.2 G/DL (ref 6.4–8.2)
RBC # BLD AUTO: 5.51 M/UL (ref 4.2–5.3)
SODIUM SERPL-SCNC: 142 MMOL/L (ref 136–145)
WBC # BLD AUTO: 5.2 K/UL (ref 4.6–13.2)

## 2021-06-22 PROCEDURE — 85025 COMPLETE CBC W/AUTO DIFF WBC: CPT

## 2021-06-22 PROCEDURE — 1090F PRES/ABSN URINE INCON ASSESS: CPT | Performed by: NURSE PRACTITIONER

## 2021-06-22 PROCEDURE — 91200 LIVER ELASTOGRAPHY: CPT | Performed by: NURSE PRACTITIONER

## 2021-06-22 PROCEDURE — 36415 COLL VENOUS BLD VENIPUNCTURE: CPT

## 2021-06-22 PROCEDURE — G8427 DOCREV CUR MEDS BY ELIG CLIN: HCPCS | Performed by: NURSE PRACTITIONER

## 2021-06-22 PROCEDURE — 80048 BASIC METABOLIC PNL TOTAL CA: CPT

## 2021-06-22 PROCEDURE — 99214 OFFICE O/P EST MOD 30 MIN: CPT | Performed by: NURSE PRACTITIONER

## 2021-06-22 PROCEDURE — G8753 SYS BP > OR = 140: HCPCS | Performed by: NURSE PRACTITIONER

## 2021-06-22 PROCEDURE — 3017F COLORECTAL CA SCREEN DOC REV: CPT | Performed by: NURSE PRACTITIONER

## 2021-06-22 PROCEDURE — G8754 DIAS BP LESS 90: HCPCS | Performed by: NURSE PRACTITIONER

## 2021-06-22 PROCEDURE — G8432 DEP SCR NOT DOC, RNG: HCPCS | Performed by: NURSE PRACTITIONER

## 2021-06-22 PROCEDURE — G8400 PT W/DXA NO RESULTS DOC: HCPCS | Performed by: NURSE PRACTITIONER

## 2021-06-22 PROCEDURE — G0463 HOSPITAL OUTPT CLINIC VISIT: HCPCS | Performed by: NURSE PRACTITIONER

## 2021-06-22 PROCEDURE — G8536 NO DOC ELDER MAL SCRN: HCPCS | Performed by: NURSE PRACTITIONER

## 2021-06-22 PROCEDURE — G8419 CALC BMI OUT NRM PARAM NOF/U: HCPCS | Performed by: NURSE PRACTITIONER

## 2021-06-22 PROCEDURE — 1101F PT FALLS ASSESS-DOCD LE1/YR: CPT | Performed by: NURSE PRACTITIONER

## 2021-06-22 PROCEDURE — 80076 HEPATIC FUNCTION PANEL: CPT

## 2021-06-22 RX ORDER — LIDOCAINE 50 MG/G
1 PATCH TOPICAL DAILY
COMMUNITY
Start: 2021-05-11 | End: 2022-05-11

## 2021-06-22 NOTE — PROGRESS NOTES
Alecia Whitfield MD, Sage Williamson MD, MPH      Robert Oropeza, STEPHANIE Navarrete, [facility]     Georgina Jordan, [facility]   Enrique Dewey P-AVA [facility]en [facility]       [facility]    at [address], [address] [facility], [facility] 22.    [phone]    FAX: [address] [place] [address], [address]    [phone]    FAX: [phone]         Patient Care Team:  Rahat Swanson DO as PCP - General (Internal Medicine)   [place]. [place]      Problem List  Date Reviewed: 6/22/2021        Codes Class Noted    KEARNEY (nonalcoholic steatohepatitis) ICD-10-CM: K75.81  ICD-9-CM: 571.8  6/13/2018        Type [facility] diabetes mellitus ([facility] ICD-10-CM: E11.9  ICD-9-CM: 250.00  6/13/2018        Hypertension ICD-10-CM: I10  ICD-9-CM: 401.9  6/13/2018        Hypercholesteremia ICD-10-CM: E78.00  ICD-9-CM: 272.0  6/13/2018              Singh Brunner returns to the [address] [address] today for fibroscan assessment of hepatic fibrosis and for education and management of KEARNEY. The active problem list, all pertinent past medical history, medications, liver histology, endoscopic studies, radiologic findings and laboratory findings related to the liver disorder were reviewed with the patient. The patient is a 80 y.o.  female who was found to have fatty liver disease on liver biopsy     Serologic evaluation for markers of chronic liver disease were positive for  DERIC. Ultrasound of the liver was most recently performed in 11/2019. The results of the imaging suggested fatty liver disease. No hepatic masses. Shear wave elastography was performed in 11/2019.   This suggests a Metavir fibrosis score of F0, normal hepatic fibrosis. Today's fibroscan analysis also suggests no hepatic fibrosis. The scan also indicates a fatty liver. A liver biopsy was performed in 1/2018. 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 The patient has no symptoms which can be attributed to the liver disorder. The patient completes all daily activities without any functional limitations. The patient has not experienced fatigue, fevers, chills, shortness of breath, chest pain, pain in the right side over the liver, diffuse abdominal pain, nausea, vomiting, constipation, diarrhrea, dry eyes, dry mouth, arthralgias, myalgias, yellowing of the eyes or skin, itching, dark urine, problems concentrating, swelling of the abdomen, swelling of the lower extremities, hematemesis, or hematochezia. All of the issues listed in the Assessment and Plan were discussed with the patient. All questions were answered. The patient expressed a clear understanding of the above. Since the last office appointment:  Has lost 5 pounds. 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 The most recent laboratory studies indicate that the liver transaminases are normal, alkaline phosphatase is normal, tests of hepatic synthetic and metabolic function are normal, and the platelet count is normal.       The patient had a liver biopsy performed in [facility]. Will request that the liver biopsy slides be sent for review by Dr. [place]. Fibroscan assessment was performed during this appointment. Results of the scan indicate normal liver stiffness (suggested Metavir fibrosis score is F0). The scan also indicates a fatty liver.       The Fibroscan can be repeated annually or as often as clinically indicated to assess for fibrosis progression and/or regression. We discussed metabolic syndrome. I explained that she has several components of metabolic syndrome including central obesity, HTN, hypercholesterolemia, and diabetes. The patient was counseled regarding diet and exercise to achieve weight loss. The best diet for patients with fatty liver is one very low in carbohydrates and enriched with protein such as an [employer] program.      The patient was told to consume any food products and drinks containing fructose as this enhances hepatic fat synthesis. There is no medication of vitamin supplements that we advocate for KEARNEY. Using glitazones in patients without diabetes mellitus has been shown to reduce fat content in the liver but has no effect on fibrosis and is associated with weight gain. Vitamin E has also been used but the data is not very good and most experts no longer advocate this. The only medical treatments for KEARNEY are though clinical trials. The patient was offered participation in a clinical trial for treatment of KEARNEY. Treatment of other medical problems in patients with chronic liver disease  The patient was directed to continue all current medications at the current dosages. There are no contraindications for the patient to take any medications that are necessary for treatment of other medical issues. The patient can take oral diabetic agents for treatment of diabetes and statins for treatment of hypercholesterolemia. This will not impact the patient's current liver disease. The patient does not consume alcohol daily. Normal doses of acetaminophen can be used for pain as needed. Normal doses of acetaminophen as recommended on the label are not hepatotoxic, even in patients with cirrhosis. The patient does not have cirrhosis. Normal doses of NSAIDs can be used for pain as needed.     Counseling for alcohol in patients with chronic liver disease  The patient was counseled regarding alcohol consumption and the effect of alcohol on chronic liver disease. The patient was reminded that alcohol can cause fatty liver. The patient does not consume any significant amount of alcohol. Vaccinations   Vaccination for viral hepatitis B is recommended since the patient has no serologic evidence of previous exposure or vaccination with immunity. The need for vaccination against viral hepatitis A will be assessed with serologic and instituted as appropriate. Routine vaccinations against other bacterial and viral agents can be performed as indicated. Annual flu vaccination should be administered if indicated. Screening for Hepatocellular Carcinoma  HCC screening is not necessary since the patient has no evidence of cirrhosis. ALLERGIES  Allergies   Allergen Reactions    Sulfasalazine Rash    Lotrel [Amlodipine-Benazepril] Cough       MEDICATIONS  Current Outpatient Medications   Medication Sig    lidocaine (LIDODERM) 5 % Apply 1 Patch to affected area daily.  fluticasone propionate (FLONASE) 50 mcg/actuation nasal spray 2 Sprays by Nasal route daily.  metFORMIN ER (GLUCOPHAGE XR) 500 mg tablet Take 500 mg by mouth two (2) times a day.  cyanocobalamin 1,000 mcg tablet Take 1,000 mcg by mouth daily.  aspirin 81 mg chewable tablet Take 81 mg by mouth daily.  amLODIPine (NORVASC) 10 mg tablet Take 10 mg by mouth.  fexofenadine (ALLEGRA) 180 mg tablet Take 1 Tab by mouth.  glipiZIDE SR (GLUCOTROL XL) 5 mg CR tablet Take 5 mg by mouth three (3) times daily.  simvastatin (ZOCOR) 20 mg tablet Take 1 Tab by mouth.  triamterene-hydroCHLOROthiazide (DYAZIDE) 37.5-25 mg per capsule     cholecalciferol (VITAMIN D3) 1,000 unit cap Take 2,000 Units by mouth. No current facility-administered medications for this visit. SYSTEM REVIEW NOT RELATED TO LIVER DISEASE OR REVIEWED ABOVE:  Constitution systems: Negative for fever, chills, weight gain, weight loss.    Eyes: Negative for visual changes. ENT: Negative for sore throat, painful swallowing. Respiratory: Negative for cough, hemoptysis, SOB. Cardiology: Negative for chest pain, palpitations. GI:  Negative for constipation or diarrhea. : Negative for urinary frequency, dysuria, hematuria, nocturia. Skin: Negative for rash. Hematology: Negative for easy bruising, blood clots. Musculo-skelatal: Negative for back pain, muscle pain, weakness. Neurologic: Negative for headaches, dizziness, vertigo, memory problems not related to HE. Psychology: Negative for anxiety, depression. FAMILY HISTORY:  The father  of CVA. The mother  of pancreas cancer. There is no family history of liver disease. SOCIAL HISTORY:  The patient is . The patient has 2 children, and 2 grandchildren. The patient has never used tobacco products. The patient has never consumed significant amounts of alcohol. The patient is retired. PHYSICAL EXAMINATION:  Visit Vitals  BP (!) 140/75   Pulse 68   Temp (!) 96.7 °F (35.9 °C)   Resp 17   Ht 5' 1\" (1.549 m)   Wt 140 lb (63.5 kg)   SpO2 99%   BMI 26.45 kg/m²     General: No acute distress. Eyes: Sclera anicteric. ENT: No oral lesions. Thyroid normal.  Nodes: No adenopathy. Skin: No spider angiomata. No jaundice. No palmar erythema. Respiratory: Lungs clear to auscultation. Cardiovascular: Regular heart rate. No murmurs. No JVD. Abdomen: Soft non-tender. Liver size normal to percussion/palpation. Spleen not palpable. No obvious ascites. Extremities: No edema. No muscle wasting. No gross arthritic changes. Neurologic: Alert and oriented. Cranial nerves grossly intact. No asterixis.     LABORATORY STUDIES:  Liver [place] of [address] Units 2021 10/27/2020   WBC 4.6 - 13.2 K/uL 5.2 6.2   ANC 1.8 - 8.0 K/UL 2.9 3.5   HGB 12.0 - 16.0 g/dL 11.7 (L) 12.7    - 420 K/uL 242 260   AST 10 - 38 U/L 12 17   ALT 13 - 56 U/L 27 37 Alk Phos 45 - 117 U/L 69 78   Bili, Total 0.2 - 1.0 MG/DL 0.3 0.2   Bili, Direct 0.0 - 0.2 MG/DL 0.1 <0.1   Albumin 3.4 - 5.0 g/dL 3.9 3.9   BUN 7.0 - 18 MG/DL 12 15   Creat 0.6 - 1.3 MG/DL 0.66 0.98   Na 136 - 145 mmol/L 142 139   K 3.5 - 5.5 mmol/L 4.0 3.7   Cl 100 - 111 mmol/L 108 102   CO2 21 - 32 mmol/L 30 32   Glucose 74 - 99 mg/dL 120 (H) 189 (H)   Magnesium 1.6 - 2.6 mg/dL       SEROLOGIES:  11/2017. HBsAntigen negative, anti-HBcore negative, anti-HBsurface negative, anti-HCV negative, iron saturation 20%, DERIC positive, ASMA negative, AMA negative, alpha-1-antitrypsin 140. LIVER HISTOLOGY:  1/2018.  30% steatosis, mild inflammation. Portal and pericellular fibrosis, stage 2.      11/20018. TRANSIENT HEPATIC ELASTOGRAPHY:   E Range: 4.6-7.05 kPa  E Mean: 5.3 kPa  E Median: 5.1 kPa  E Std: 0.74 kPa    11/2019. Elastography:     > Range 4.50-7.68 kPa    > Mean 5.75 kPa    > Median 5.56 kPa    > Standard deviation 1.0 kPa    06/2021. FibroScan performed at [address]. EkPa was 4.4. IQR/med 12%. . The results suggested a fibrosis level of F0. The CAP score suggests there is hepatic steatosis. ENDOSCOPIC PROCEDURES:  Not available or performed    RADIOLOGY:  5/2018. Ultrasound of liver. Echogenic consistent with fatty liver. No liver mass lesions. No dilated bile ducts. No ascites. 11/2018. Ultrasound of the liver. Mild heterogeneous diffuse increased hepatic parenchymal echotexture, sonographically favoring steatosis. No focal hepatic mass. 11/219. Ultrasound of the liver. Hyperechogenicity of liver parenchyma similar to prior, likely steatosis. No solid mass. OTHER TESTING:  Not available or performed    [address] in one year for fibroscan and continued routine monitoring.       SURINDER Nelson  Liver [place] of [facility]  [address], [address] [facility], [address] [phone]

## 2022-06-22 ENCOUNTER — HOSPITAL ENCOUNTER (OUTPATIENT)
Dept: LAB | Age: 72
Discharge: HOME | End: 2022-06-22
Payer: MEDICARE

## 2022-06-22 ENCOUNTER — OFFICE VISIT (OUTPATIENT)
Dept: HEMATOLOGY | Age: 72
End: 2022-06-22
Payer: MEDICARE

## 2022-06-22 VITALS
TEMPERATURE: 98.2 F | DIASTOLIC BLOOD PRESSURE: 65 MMHG | OXYGEN SATURATION: 98 % | SYSTOLIC BLOOD PRESSURE: 144 MMHG | BODY MASS INDEX: 26.81 KG/M2 | WEIGHT: 142 LBS | HEART RATE: 65 BPM | HEIGHT: 61 IN | RESPIRATION RATE: 16 BRPM

## 2022-06-22 DIAGNOSIS — K75.81 NASH (NONALCOHOLIC STEATOHEPATITIS): Primary | ICD-10-CM

## 2022-06-22 DIAGNOSIS — K75.81 NASH (NONALCOHOLIC STEATOHEPATITIS): ICD-10-CM

## 2022-06-22 LAB
ALBUMIN SERPL-MCNC: 3.7 G/DL (ref 3.4–5)
ALBUMIN/GLOB SERPL: 1.1 {RATIO} (ref 0.8–1.7)
ALP SERPL-CCNC: 63 U/L (ref 45–117)
ALT SERPL-CCNC: 20 U/L (ref 13–56)
ANION GAP SERPL CALC-SCNC: 2 MMOL/L (ref 3–18)
AST SERPL-CCNC: 7 U/L (ref 10–38)
BASOPHILS # BLD: 0 K/UL (ref 0–0.1)
BASOPHILS NFR BLD: 1 % (ref 0–2)
BILIRUB DIRECT SERPL-MCNC: <0.1 MG/DL (ref 0–0.2)
BILIRUB SERPL-MCNC: 0.2 MG/DL (ref 0.2–1)
BUN SERPL-MCNC: 15 MG/DL (ref 7–18)
BUN/CREAT SERPL: 21 (ref 12–20)
CALCIUM SERPL-MCNC: 9.2 MG/DL (ref 8.5–10.1)
CHLORIDE SERPL-SCNC: 109 MMOL/L (ref 100–111)
CO2 SERPL-SCNC: 31 MMOL/L (ref 21–32)
CREAT SERPL-MCNC: 0.72 MG/DL (ref 0.6–1.3)
DIFFERENTIAL METHOD BLD: ABNORMAL
EOSINOPHIL # BLD: 0 K/UL (ref 0–0.4)
EOSINOPHIL NFR BLD: 1 % (ref 0–5)
ERYTHROCYTE [DISTWIDTH] IN BLOOD BY AUTOMATED COUNT: 16.9 % (ref 11.6–14.5)
GLOBULIN SER CALC-MCNC: 3.3 G/DL (ref 2–4)
GLUCOSE SERPL-MCNC: 117 MG/DL (ref 74–99)
HCT VFR BLD AUTO: 38.7 % (ref 35–45)
HGB BLD-MCNC: 11.9 G/DL (ref 12–16)
IMM GRANULOCYTES # BLD AUTO: 0 K/UL (ref 0–0.04)
IMM GRANULOCYTES NFR BLD AUTO: 0 % (ref 0–0.5)
LYMPHOCYTES # BLD: 1.5 K/UL (ref 0.9–3.6)
LYMPHOCYTES NFR BLD: 37 % (ref 21–52)
MCH RBC QN AUTO: 21.1 PG (ref 24–34)
MCHC RBC AUTO-ENTMCNC: 30.7 G/DL (ref 31–37)
MCV RBC AUTO: 68.5 FL (ref 78–100)
MONOCYTES # BLD: 0.3 K/UL (ref 0.05–1.2)
MONOCYTES NFR BLD: 8 % (ref 3–10)
NEUTS SEG # BLD: 2.3 K/UL (ref 1.8–8)
NEUTS SEG NFR BLD: 53 % (ref 40–73)
NRBC # BLD: 0 K/UL (ref 0–0.01)
NRBC BLD-RTO: 0 PER 100 WBC
PLATELET # BLD AUTO: 209 K/UL (ref 135–420)
PLATELET COMMENTS,PCOM: ABNORMAL
PMV BLD AUTO: 12.1 FL (ref 9.2–11.8)
POTASSIUM SERPL-SCNC: 3.9 MMOL/L (ref 3.5–5.5)
PROT SERPL-MCNC: 7 G/DL (ref 6.4–8.2)
RBC # BLD AUTO: 5.65 M/UL (ref 4.2–5.3)
RBC MORPH BLD: ABNORMAL
SODIUM SERPL-SCNC: 142 MMOL/L (ref 136–145)
WBC # BLD AUTO: 4.1 K/UL (ref 4.6–13.2)

## 2022-06-22 PROCEDURE — G0463 HOSPITAL OUTPT CLINIC VISIT: HCPCS | Performed by: NURSE PRACTITIONER

## 2022-06-22 PROCEDURE — 85025 COMPLETE CBC W/AUTO DIFF WBC: CPT

## 2022-06-22 PROCEDURE — G8536 NO DOC ELDER MAL SCRN: HCPCS | Performed by: NURSE PRACTITIONER

## 2022-06-22 PROCEDURE — 80048 BASIC METABOLIC PNL TOTAL CA: CPT

## 2022-06-22 PROCEDURE — 91200 LIVER ELASTOGRAPHY: CPT | Performed by: NURSE PRACTITIONER

## 2022-06-22 PROCEDURE — 80076 HEPATIC FUNCTION PANEL: CPT

## 2022-06-22 PROCEDURE — 36415 COLL VENOUS BLD VENIPUNCTURE: CPT

## 2022-06-22 PROCEDURE — G8400 PT W/DXA NO RESULTS DOC: HCPCS | Performed by: NURSE PRACTITIONER

## 2022-06-22 PROCEDURE — 1123F ACP DISCUSS/DSCN MKR DOCD: CPT | Performed by: NURSE PRACTITIONER

## 2022-06-22 PROCEDURE — G8419 CALC BMI OUT NRM PARAM NOF/U: HCPCS | Performed by: NURSE PRACTITIONER

## 2022-06-22 PROCEDURE — G8427 DOCREV CUR MEDS BY ELIG CLIN: HCPCS | Performed by: NURSE PRACTITIONER

## 2022-06-22 PROCEDURE — 1090F PRES/ABSN URINE INCON ASSESS: CPT | Performed by: NURSE PRACTITIONER

## 2022-06-22 PROCEDURE — 3017F COLORECTAL CA SCREEN DOC REV: CPT | Performed by: NURSE PRACTITIONER

## 2022-06-22 PROCEDURE — 1101F PT FALLS ASSESS-DOCD LE1/YR: CPT | Performed by: NURSE PRACTITIONER

## 2022-06-22 PROCEDURE — 99214 OFFICE O/P EST MOD 30 MIN: CPT | Performed by: NURSE PRACTITIONER

## 2022-06-22 PROCEDURE — G8432 DEP SCR NOT DOC, RNG: HCPCS | Performed by: NURSE PRACTITIONER

## 2022-06-22 PROCEDURE — G8753 SYS BP > OR = 140: HCPCS | Performed by: NURSE PRACTITIONER

## 2022-06-22 PROCEDURE — G8754 DIAS BP LESS 90: HCPCS | Performed by: NURSE PRACTITIONER

## 2022-06-22 RX ORDER — CARBOXYMETHYLCELLULOSE SODIUM 5 MG/ML
1 SOLUTION/ DROPS OPHTHALMIC
COMMUNITY
Start: 2022-02-16

## 2022-06-22 RX ORDER — LIDOCAINE 50 MG/G
1 PATCH TOPICAL DAILY
COMMUNITY
Start: 2022-05-12 | End: 2023-05-12

## 2022-06-22 RX ORDER — METFORMIN HYDROCHLORIDE 500 MG/1
500 TABLET, EXTENDED RELEASE ORAL 2 TIMES DAILY
COMMUNITY
Start: 2021-09-21 | End: 2022-09-21

## 2022-06-22 RX ORDER — IBUPROFEN 600 MG/1
600 TABLET ORAL
COMMUNITY
Start: 2022-05-12 | End: 2023-05-12

## 2022-06-22 NOTE — PROGRESS NOTES
[address] [place] [address], MD, FACP, [place], [place]      STEPHANIE Strickland, [facility]     Georgina Jordan [facility]   AMARJIT Mcallister-AVA [facility]       Willie Jimenez [facility] [place] [address]    at [address], [address] [place] [address], [facility] 22.    [phone]    FAX: [address] [place] [address]    at [address], [address]    [phone]    FAX: [phone]       Patient Care Team:  Claudia Shannon DO as PCP - General (Internal Medicine Physician)   Aileen Mahoneyo, [address] [place] [address]      Problem List  Date Reviewed: 6/22/2021          Codes Class Noted    KEARNEY (nonalcoholic steatohepatitis) ICD-10-CM: K75.81  ICD-9-CM: 571.8  6/13/2018        Type II diabetes mellitus ([facility] [address] ICD-10-CM: E11.9  ICD-9-CM: 250.00  6/13/2018        Hypertension ICD-10-CM: I10  ICD-9-CM: 401.9  6/13/2018        Hypercholesteremia ICD-10-CM: E78.00  ICD-9-CM: 272.0  6/13/2018              Sreekanth Elliott returns to the [facility] today for fibroscan assessment of hepatic fibrosis and for education and management of KEARNEY. The active problem list, all pertinent past medical history, medications, liver histology, endoscopic studies, radiologic findings and laboratory findings related to the liver disorder were reviewed with the patient. The patient is a 71 y.o.  female who was found to have fatty liver disease on liver biopsy     Serologic evaluation for markers of chronic liver disease were positive for  DERIC. Ultrasound of the liver was most recently performed in 11/2019. The results of the imaging suggested fatty liver disease. No hepatic masses. Shear wave elastography was performed in 11/2019.   This suggests a Metavir fibrosis score of F0, normal hepatic fibrosis. Today's fibroscan analysis also suggests no hepatic fibrosis. The scan also indicates a fatty liver. A liver biopsy was performed in 1/2018. 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 The patient has no symptoms which can be attributed to the liver disorder. The patient completes all daily activities without any functional limitations. The patient has not experienced fatigue, fevers, chills, shortness of breath, chest pain, pain in the right side over the liver, diffuse abdominal pain, nausea, vomiting, constipation, diarrhrea, dry eyes, dry mouth, arthralgias, myalgias, yellowing of the eyes or skin, itching, dark urine, problems concentrating, swelling of the abdomen, swelling of the lower extremities, hematemesis, or hematochezia. All of the issues listed in the Assessment and Plan were discussed with the patient. All questions were answered. The patient expressed a clear understanding of the above. 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 The most recent laboratory studies indicate that the liver transaminases are normal, alkaline phosphatase is normal, tests of hepatic synthetic and metabolic function are normal, and the platelet count is normal.       The patient had a liver biopsy performed in [facility]. Will request that the liver biopsy slides be sent for review by Dr. Rivas Casiano. Fibroscan assessment was performed during this appointment. Results of the scan indicate normal liver stiffness (suggested Metavir fibrosis score is F0). The scan also indicates a fatty liver. The Fibroscan can be repeated annually or as often as clinically indicated to assess for fibrosis progression and/or regression. We discussed metabolic syndrome.   I explained that she has several components of metabolic syndrome including central obesity, HTN, hypercholesterolemia, and diabetes. The patient was counseled regarding diet and exercise to achieve weight loss. The best diet for patients with fatty liver is one very low in carbohydrates and enriched with protein such as an Tyler's program.      The patient was told to consume any food products and drinks containing fructose as this enhances hepatic fat synthesis. There is no medication of vitamin supplements that we advocate for KEARNEY. Using glitazones in patients without diabetes mellitus has been shown to reduce fat content in the liver but has no effect on fibrosis and is associated with weight gain. Vitamin E has also been used but the data is not very good and most experts no longer advocate this. The only medical treatments for KEARNEY are though clinical trials. The patient was offered participation in a clinical trial for treatment of KEARNEY. Treatment of other medical problems in patients with chronic liver disease  The patient was directed to continue all current medications at the current dosages. There are no contraindications for the patient to take any medications that are necessary for treatment of other medical issues. The patient can take oral diabetic agents for treatment of diabetes and statins for treatment of hypercholesterolemia. This will not impact the patient's current liver disease. The patient does not consume alcohol daily. Normal doses of acetaminophen can be used for pain as needed. Normal doses of acetaminophen as recommended on the label are not hepatotoxic, even in patients with cirrhosis. The patient does not have cirrhosis. Normal doses of NSAIDs can be used for pain as needed. Counseling for alcohol in patients with chronic liver disease  The patient was counseled regarding alcohol consumption and the effect of alcohol on chronic liver disease.   The patient was reminded that alcohol can cause fatty liver. The patient does not consume any significant amount of alcohol. Vaccinations   Vaccination for viral hepatitis B is recommended since the patient has no serologic evidence of previous exposure or vaccination with immunity. The need for vaccination against viral hepatitis A will be assessed with serologic and instituted as appropriate. Routine vaccinations against other bacterial and viral agents can be performed as indicated. Annual flu vaccination should be administered if indicated. Screening for Hepatocellular Carcinoma  HCC screening is not necessary since the patient has no evidence of cirrhosis. All of the above issues were discussed with the patient. All questions were answered. The patient expressed a clear understanding of the above. ALLERGIES  Allergies   Allergen Reactions    Sulfasalazine Rash    Lotrel [Amlodipine-Benazepril] Cough       MEDICATIONS  Current Outpatient Medications   Medication Sig    fluticasone propionate (FLONASE) 50 mcg/actuation nasal spray 2 Sprays by Nasal route daily.  cyanocobalamin 1,000 mcg tablet Take 1,000 mcg by mouth daily.  aspirin 81 mg chewable tablet Take 81 mg by mouth daily.  amLODIPine (NORVASC) 10 mg tablet Take 10 mg by mouth.  fexofenadine (ALLEGRA) 180 mg tablet Take 1 Tab by mouth.  glipiZIDE SR (GLUCOTROL XL) 5 mg CR tablet Take 5 mg by mouth three (3) times daily.  simvastatin (ZOCOR) 20 mg tablet Take 1 Tab by mouth.  triamterene-hydroCHLOROthiazide (DYAZIDE) 37.5-25 mg per capsule     cholecalciferol (VITAMIN D3) 1,000 unit cap Take 2,000 Units by mouth. No current facility-administered medications for this visit. SYSTEM REVIEW NOT RELATED TO LIVER DISEASE OR REVIEWED ABOVE:  Constitution systems: Negative for fever, chills, weight gain, weight loss. Eyes: Negative for visual changes. ENT: Negative for sore throat, painful swallowing. Respiratory: Negative for cough, hemoptysis, SOB. Cardiology: Negative for chest pain, palpitations. GI:  Negative for constipation or diarrhea. : Negative for urinary frequency, dysuria, hematuria, nocturia. Skin: Negative for rash. Hematology: Negative for easy bruising, blood clots. Musculo-skelatal: Negative for back pain, muscle pain, weakness. Neurologic: Negative for headaches, dizziness, vertigo, memory problems not related to HE. Psychology: Negative for anxiety, depression. FAMILY HISTORY:  The father  of CVA. The mother  of pancreas cancer. There is no family history of liver disease. SOCIAL HISTORY:  The patient is . The patient has 2 children, and 2 grandchildren. The patient has never used tobacco products. The patient has never consumed significant amounts of alcohol. The patient is retired. PHYSICAL EXAMINATION:  Visit Vitals  BP (!) 144/65   Pulse 65   Temp 98.2 °F (36.8 °C)   Resp 16   Ht 5' 1\" (1.549 m)   Wt 142 lb (64.4 kg)   SpO2 98%   BMI 26.83 kg/m²     General: No acute distress. Eyes: Sclera anicteric. ENT: No oral lesions. Thyroid normal.  Nodes: No adenopathy. Skin: No spider angiomata. No jaundice. No palmar erythema. Respiratory: Lungs clear to auscultation. Cardiovascular: Regular heart rate. No murmurs. No JVD. Abdomen: Soft non-tender. Liver size normal to percussion/palpation. Spleen not palpable. No obvious ascites. Extremities: No edema. No muscle wasting. No gross arthritic changes. Neurologic: Alert and oriented. Cranial nerves grossly intact. No asterixis.     LABORATORY STUDIES:  Liver [place] of [address] Units 2022   WBC 4.6 - 13.2 K/uL 4.1 (L) 5.2   ANC 1.8 - 8.0 K/UL 2.3 2.9   HGB 12.0 - 16.0 g/dL 11.9 (L) 11.7 (L)    - 420 K/uL 209 242   AST 10 - 38 U/L 7 (L) 12   ALT 13 - 56 U/L 20 27   Alk Phos 45 - 117 U/L 63 69   Bili, Total 0.2 - 1.0 MG/DL 0.2 0.3   Bili, Direct 0.0 - 0.2 MG/DL <0.1 0.1   Albumin 3.4 - 5.0 g/dL 3.7 3.9   BUN 7.0 - 18 MG/DL 15 12   Creat 0.6 - 1.3 MG/DL 0.72 0.66   Na 136 - 145 mmol/L 142 142   K 3.5 - 5.5 mmol/L 3.9 4.0   Cl 100 - 111 mmol/L 109 108   CO2 21 - 32 mmol/L 31 30   Glucose 74 - 99 mg/dL 117 (H) 120 (H)   Magnesium 1.6 - 2.6 mg/dL       SEROLOGIES:  11/2017. HBsAntigen negative, anti-HBcore negative, anti-HBsurface negative, anti-HCV negative, iron saturation 20%, DERIC positive, ASMA negative, AMA negative, alpha-1-antitrypsin 140. LIVER HISTOLOGY:  1/2018.  30% steatosis, mild inflammation. Portal and pericellular fibrosis, stage 2.      11/20018. TRANSIENT HEPATIC ELASTOGRAPHY:   E Range: 4.6-7.05 kPa  E Mean: 5.3 kPa  E Median: 5.1 kPa  E Std: 0.74 kPa    11/2019. Elastography:     > Range 4.50-7.68 kPa    > Mean 5.75 kPa    > Median 5.56 kPa    > Standard deviation 1.0 kPa    06/2021. FibroScan performed at [address]. EkPa was 4.4. IQR/med 12%. . The results suggested a fibrosis level of F0. The CAP score suggests there is hepatic steatosis. 06/2022. FibroScan performed at [address]. EkPa was 4.4. IQR/med 6%. . The results suggested a fibrosis level of F0. The CAP score suggests there is hepatic steatosis. ENDOSCOPIC PROCEDURES:  Not available or performed    RADIOLOGY:  5/2018. Ultrasound of liver. Echogenic consistent with fatty liver. No liver mass lesions. No dilated bile ducts. No ascites. 11/2018. Ultrasound of the liver. Mild heterogeneous diffuse increased hepatic parenchymal echotexture, sonographically favoring steatosis. No focal hepatic mass. 11/219. Ultrasound of the liver. Hyperechogenicity of liver parenchyma similar to prior, likely steatosis. No solid mass. OTHER TESTING:  Not available or performed    [address] on a PRN basis.       Beatriz Perry, ALEXANDRAP-C  Liver [place] of [address], [address] [place] [address] Kay [place], [address] [facility]   [phone]